# Patient Record
Sex: MALE | Race: WHITE | NOT HISPANIC OR LATINO | Employment: OTHER | URBAN - METROPOLITAN AREA
[De-identification: names, ages, dates, MRNs, and addresses within clinical notes are randomized per-mention and may not be internally consistent; named-entity substitution may affect disease eponyms.]

---

## 2018-05-11 ENCOUNTER — APPOINTMENT (OUTPATIENT)
Dept: CT IMAGING | Facility: CLINIC | Age: 63
End: 2018-05-11
Attending: FAMILY MEDICINE
Payer: COMMERCIAL

## 2018-05-11 ENCOUNTER — HOSPITAL ENCOUNTER (EMERGENCY)
Facility: CLINIC | Age: 63
Discharge: HOME | End: 2018-05-11
Attending: FAMILY MEDICINE | Admitting: FAMILY MEDICINE
Payer: COMMERCIAL

## 2018-05-11 VITALS
OXYGEN SATURATION: 95 % | RESPIRATION RATE: 18 BRPM | HEART RATE: 80 BPM | DIASTOLIC BLOOD PRESSURE: 74 MMHG | TEMPERATURE: 98.2 F | BODY MASS INDEX: 34.54 KG/M2 | HEIGHT: 72 IN | WEIGHT: 255 LBS | SYSTOLIC BLOOD PRESSURE: 134 MMHG

## 2018-05-11 DIAGNOSIS — N20.1 URETERAL STONE: ICD-10-CM

## 2018-05-11 LAB
ALBUMIN SERPL-MCNC: 4.1 G/DL (ref 3.4–5)
ALP SERPL-CCNC: 69 U/L (ref 40–150)
ALT SERPL W P-5'-P-CCNC: 63 U/L (ref 0–70)
ANION GAP SERPL CALCULATED.3IONS-SCNC: 7 MMOL/L (ref 3–14)
AST SERPL W P-5'-P-CCNC: 28 U/L (ref 0–45)
BASOPHILS # BLD AUTO: 0 10E9/L (ref 0–0.2)
BASOPHILS NFR BLD AUTO: 0.3 %
BILIRUB SERPL-MCNC: 0.3 MG/DL (ref 0.2–1.3)
BUN SERPL-MCNC: 22 MG/DL (ref 7–30)
CALCIUM SERPL-MCNC: 9.2 MG/DL (ref 8.5–10.1)
CHLORIDE SERPL-SCNC: 105 MMOL/L (ref 94–109)
CO2 SERPL-SCNC: 27 MMOL/L (ref 20–32)
CREAT SERPL-MCNC: 1.13 MG/DL (ref 0.66–1.25)
DIFFERENTIAL METHOD BLD: ABNORMAL
EOSINOPHIL # BLD AUTO: 0.1 10E9/L (ref 0–0.7)
EOSINOPHIL NFR BLD AUTO: 1.1 %
ERYTHROCYTE [DISTWIDTH] IN BLOOD BY AUTOMATED COUNT: 13.1 % (ref 10–15)
GFR SERPL CREATININE-BSD FRML MDRD: 65 ML/MIN/1.7M2
GLUCOSE SERPL-MCNC: 141 MG/DL (ref 70–99)
HCT VFR BLD AUTO: 42.7 % (ref 40–53)
HGB BLD-MCNC: 15 G/DL (ref 13.3–17.7)
IMM GRANULOCYTES # BLD: 0 10E9/L (ref 0–0.4)
IMM GRANULOCYTES NFR BLD: 0.3 %
LYMPHOCYTES # BLD AUTO: 2 10E9/L (ref 0.8–5.3)
LYMPHOCYTES NFR BLD AUTO: 18 %
MCH RBC QN AUTO: 31.2 PG (ref 26.5–33)
MCHC RBC AUTO-ENTMCNC: 35.1 G/DL (ref 31.5–36.5)
MCV RBC AUTO: 89 FL (ref 78–100)
MONOCYTES # BLD AUTO: 1.4 10E9/L (ref 0–1.3)
MONOCYTES NFR BLD AUTO: 12.9 %
NEUTROPHILS # BLD AUTO: 7.4 10E9/L (ref 1.6–8.3)
NEUTROPHILS NFR BLD AUTO: 67.4 %
PLATELET # BLD AUTO: 317 10E9/L (ref 150–450)
POTASSIUM SERPL-SCNC: 4 MMOL/L (ref 3.4–5.3)
PROT SERPL-MCNC: 7.5 G/DL (ref 6.8–8.8)
RBC # BLD AUTO: 4.81 10E12/L (ref 4.4–5.9)
SODIUM SERPL-SCNC: 139 MMOL/L (ref 133–144)
WBC # BLD AUTO: 10.9 10E9/L (ref 4–11)

## 2018-05-11 PROCEDURE — 25000128 H RX IP 250 OP 636: Performed by: FAMILY MEDICINE

## 2018-05-11 PROCEDURE — 96375 TX/PRO/DX INJ NEW DRUG ADDON: CPT | Performed by: FAMILY MEDICINE

## 2018-05-11 PROCEDURE — 96374 THER/PROPH/DIAG INJ IV PUSH: CPT | Performed by: FAMILY MEDICINE

## 2018-05-11 PROCEDURE — 80053 COMPREHEN METABOLIC PANEL: CPT | Performed by: FAMILY MEDICINE

## 2018-05-11 PROCEDURE — 74176 CT ABD & PELVIS W/O CONTRAST: CPT

## 2018-05-11 PROCEDURE — 96376 TX/PRO/DX INJ SAME DRUG ADON: CPT | Performed by: FAMILY MEDICINE

## 2018-05-11 PROCEDURE — 99284 EMERGENCY DEPT VISIT MOD MDM: CPT | Mod: Z6 | Performed by: FAMILY MEDICINE

## 2018-05-11 PROCEDURE — 85025 COMPLETE CBC W/AUTO DIFF WBC: CPT | Performed by: FAMILY MEDICINE

## 2018-05-11 PROCEDURE — 25000132 ZZH RX MED GY IP 250 OP 250 PS 637: Performed by: FAMILY MEDICINE

## 2018-05-11 PROCEDURE — 96361 HYDRATE IV INFUSION ADD-ON: CPT | Performed by: FAMILY MEDICINE

## 2018-05-11 PROCEDURE — 99285 EMERGENCY DEPT VISIT HI MDM: CPT | Mod: 25 | Performed by: FAMILY MEDICINE

## 2018-05-11 RX ORDER — TAMSULOSIN HYDROCHLORIDE 0.4 MG/1
0.4 CAPSULE ORAL DAILY
Qty: 10 CAPSULE | Refills: 0 | Status: SHIPPED | OUTPATIENT
Start: 2018-05-11 | End: 2020-10-12

## 2018-05-11 RX ORDER — HYDROMORPHONE HYDROCHLORIDE 2 MG/1
2-4 TABLET ORAL EVERY 6 HOURS PRN
Qty: 12 TABLET | Refills: 0 | Status: SHIPPED | OUTPATIENT
Start: 2018-05-11 | End: 2018-05-11

## 2018-05-11 RX ORDER — ONDANSETRON 4 MG/1
4-8 TABLET, ORALLY DISINTEGRATING ORAL EVERY 8 HOURS PRN
Qty: 2 TABLET | Refills: 0 | Status: SHIPPED | OUTPATIENT
Start: 2018-05-11 | End: 2018-05-11

## 2018-05-11 RX ORDER — HYDROMORPHONE HYDROCHLORIDE 1 MG/ML
0.5 INJECTION, SOLUTION INTRAMUSCULAR; INTRAVENOUS; SUBCUTANEOUS
Status: DISCONTINUED | OUTPATIENT
Start: 2018-05-11 | End: 2018-05-11 | Stop reason: HOSPADM

## 2018-05-11 RX ORDER — HYDROMORPHONE HYDROCHLORIDE 2 MG/1
2-4 TABLET ORAL EVERY 6 HOURS PRN
Qty: 12 TABLET | Refills: 0 | Status: SHIPPED | OUTPATIENT
Start: 2018-05-11 | End: 2020-10-12

## 2018-05-11 RX ORDER — ONDANSETRON 2 MG/ML
4 INJECTION INTRAMUSCULAR; INTRAVENOUS ONCE
Status: DISCONTINUED | OUTPATIENT
Start: 2018-05-11 | End: 2018-05-11 | Stop reason: HOSPADM

## 2018-05-11 RX ORDER — ONDANSETRON 2 MG/ML
4 INJECTION INTRAMUSCULAR; INTRAVENOUS
Status: DISCONTINUED | OUTPATIENT
Start: 2018-05-11 | End: 2018-05-11 | Stop reason: HOSPADM

## 2018-05-11 RX ORDER — TAMSULOSIN HYDROCHLORIDE 0.4 MG/1
0.4 CAPSULE ORAL ONCE
Status: COMPLETED | OUTPATIENT
Start: 2018-05-11 | End: 2018-05-11

## 2018-05-11 RX ORDER — ONDANSETRON 4 MG/1
4-8 TABLET, ORALLY DISINTEGRATING ORAL EVERY 8 HOURS PRN
Qty: 12 TABLET | Refills: 0 | Status: SHIPPED | OUTPATIENT
Start: 2018-05-11 | End: 2018-05-11

## 2018-05-11 RX ORDER — TAMSULOSIN HYDROCHLORIDE 0.4 MG/1
0.4 CAPSULE ORAL DAILY
Qty: 10 CAPSULE | Refills: 0 | Status: SHIPPED | OUTPATIENT
Start: 2018-05-11 | End: 2018-05-11

## 2018-05-11 RX ORDER — ONDANSETRON 4 MG/1
4 TABLET, ORALLY DISINTEGRATING ORAL EVERY 8 HOURS PRN
Status: DISCONTINUED | OUTPATIENT
Start: 2018-05-11 | End: 2018-05-11 | Stop reason: HOSPADM

## 2018-05-11 RX ORDER — HYDROMORPHONE HYDROCHLORIDE 2 MG/1
2-4 TABLET ORAL EVERY 6 HOURS PRN
Qty: 4 TABLET | Refills: 0 | Status: SHIPPED | OUTPATIENT
Start: 2018-05-11 | End: 2018-05-11

## 2018-05-11 RX ORDER — ONDANSETRON 4 MG/1
4-8 TABLET, ORALLY DISINTEGRATING ORAL EVERY 8 HOURS PRN
Qty: 12 TABLET | Refills: 0 | Status: SHIPPED | OUTPATIENT
Start: 2018-05-11 | End: 2020-10-12

## 2018-05-11 RX ORDER — HYDROMORPHONE HYDROCHLORIDE 2 MG/1
2-4 TABLET ORAL EVERY 6 HOURS PRN
Qty: 4 TABLET | Refills: 0 | Status: SHIPPED | OUTPATIENT
Start: 2018-05-11 | End: 2020-10-12

## 2018-05-11 RX ORDER — ONDANSETRON 4 MG/1
4-8 TABLET, ORALLY DISINTEGRATING ORAL EVERY 8 HOURS PRN
Qty: 2 TABLET | Refills: 0 | Status: SHIPPED | OUTPATIENT
Start: 2018-05-11 | End: 2020-10-12

## 2018-05-11 RX ORDER — HYDROMORPHONE HYDROCHLORIDE 2 MG/1
2-4 TABLET ORAL EVERY 6 HOURS PRN
Status: DISCONTINUED | OUTPATIENT
Start: 2018-05-11 | End: 2018-05-11 | Stop reason: HOSPADM

## 2018-05-11 RX ORDER — KETOROLAC TROMETHAMINE 30 MG/ML
30 INJECTION, SOLUTION INTRAMUSCULAR; INTRAVENOUS ONCE
Status: COMPLETED | OUTPATIENT
Start: 2018-05-11 | End: 2018-05-11

## 2018-05-11 RX ADMIN — TAMSULOSIN HYDROCHLORIDE 0.4 MG: 0.4 CAPSULE ORAL at 03:16

## 2018-05-11 RX ADMIN — ONDANSETRON 4 MG: 2 INJECTION INTRAMUSCULAR; INTRAVENOUS at 01:34

## 2018-05-11 RX ADMIN — HYDROMORPHONE HYDROCHLORIDE 0.5 MG: 1 INJECTION, SOLUTION INTRAMUSCULAR; INTRAVENOUS; SUBCUTANEOUS at 03:13

## 2018-05-11 RX ADMIN — SODIUM CHLORIDE 1000 ML: 9 INJECTION, SOLUTION INTRAVENOUS at 01:34

## 2018-05-11 RX ADMIN — HYDROMORPHONE HYDROCHLORIDE 0.5 MG: 1 INJECTION, SOLUTION INTRAMUSCULAR; INTRAVENOUS; SUBCUTANEOUS at 01:37

## 2018-05-11 RX ADMIN — KETOROLAC TROMETHAMINE 30 MG: 30 INJECTION, SOLUTION INTRAMUSCULAR at 01:34

## 2018-05-11 NOTE — ED AVS SNAPSHOT
[facility] Emergency Department    [address] [address]    Phone:  [phone]    Fax:  [phone]                                       Igor Norris   MRN: [id number]    Department:  [facility] Emergency Department   Date of Visit:  5/11/2018           After Visit Summary Signature Page     I have received my discharge instructions, and my questions have been answered. I have discussed any challenges I see with this plan with the nurse or doctor.    ..........................................................................................................................................  Patient/Patient Representative Signature      ..........................................................................................................................................  Patient Representative Print Name and Relationship to Patient    ..................................................               ................................................  Date                                            Time    ..........................................................................................................................................  Reviewed by Signature/Title    ...................................................              ..............................................  Date                                                            Time

## 2018-05-11 NOTE — ED NOTES
Presents with sudden onset RLQ pain, started approx 4 hours ago. Pt took advil PM 1 tab to try to sleep unable. No n/v, no fevers. Tried to make self vomit to feel better, unable, normal BM tonight. Pt ate at 1800.

## 2018-05-11 NOTE — ED AVS SNAPSHOT
[facility] Department    [address] [address]    Phone:  [phone]    Fax:  [phone]                                       Igor Norris   MRN: [id number]    Department:  [facility] Department   Date of Visit:  5/11/2018           Patient Information     Date Of Birth          1955        Your diagnoses for this visit were:     Ureteral stone        You were seen by Marcus Rojas MD.        Discharge Instructions       Return to the Emergency Room if the following occurs:     Worsened pain, fever, nausea with vomiting and dehydration, or for any concern at anytime.    Or, follow-up with the following provider as we discussed:     Return to urology if not improving over the next 5-7 days.  Call [phone] to schedule.    Medications discussed:    Ibuprofen 600mg every 6 hours for pain (7 days duration).  Tylenol 1000mg every 6 hours for pain (7 days duration).  Dilaudid 1-2 tabs every 6 hours, for pain not relieved by the above.  Zofran for nausea, as needed.  Flomax.    If you received pain-relieving or sedating medication during your time in the ER, avoid alcohol, driving automobiles, or working with machinery.  Also, a responsible adult must stay with you.      If you had X-rays or labs done we will attempt to contact you if there is a change needed in your care.      Call the Nurse Advice Line at [phone] or [phone] for any concern at anytime.        24 Hour Appointment Hotline       To make an appointment at any [facility] clinic, call [phone] ([phone]). If you don't have a family doctor or clinic, we will help you find one. [facility] clinics are conveniently located to serve the needs of you and your family.             Review of your medicines      START taking        Dose / Directions Last dose taken    * HYDROmorphone 2 MG tablet   Commonly known as:  DILAUDID   Dose:  2-4 mg   Quantity:  4 tablet        Take 1-2  tablets (2-4 mg) by mouth every 6 hours as needed for pain   Refills:  0        * HYDROmorphone 2 MG tablet   Commonly known as:  DILAUDID   Dose:  2-4 mg   Quantity:  12 tablet        Take 1-2 tablets (2-4 mg) by mouth every 6 hours as needed for pain   Refills:  0        * ondansetron 4 MG ODT tab   Commonly known as:  ZOFRAN ODT   Dose:  4-8 mg   Quantity:  2 tablet        Take 1-2 tablets (4-8 mg) by mouth every 8 hours as needed for nausea   Refills:  0        * ondansetron 4 MG ODT tab   Commonly known as:  ZOFRAN ODT   Dose:  4-8 mg   Quantity:  12 tablet        Take 1-2 tablets (4-8 mg) by mouth every 8 hours as needed for nausea   Refills:  0        tamsulosin 0.4 MG capsule   Commonly known as:  FLOMAX   Dose:  0.4 mg   Quantity:  10 capsule        Take 1 capsule (0.4 mg) by mouth daily for 10 days   Refills:  0        * Notice:  This list has 4 medication(s) that are the same as other medications prescribed for you. Read the directions carefully, and ask your doctor or other care provider to review them with you.      Our records show that you are taking the medicines listed below. If these are incorrect, please call your family doctor or clinic.        Dose / Directions Last dose taken    aspirin 81 MG EC tablet        1 TABLET DAILY   Refills:  0        buPROPion 150 MG 12 hr tablet   Commonly known as:  ZYBAN   Dose:  150 mg        Take 150 mg by mouth 2 times daily   Refills:  0        lisinopril-hydrochlorothiazide 20-25 MG per tablet   Commonly known as:  PRINZIDE/ZESTORETIC        1 TABLET DAILY   Refills:  0        meclizine 12.5 MG tablet   Commonly known as:  ANTIVERT   Dose:  12.5 mg   Quantity:  30 tablet        Take 1 tablet (12.5 mg) by mouth 4 times daily as needed for dizziness   Refills:  0        PRAVASTATIN SODIUM PO   Dose:  80 mg        Take 80 mg by mouth   Refills:  0                Information about OPIOIDS     PRESCRIPTION OPIOIDS: WHAT YOU NEED TO KNOW   You have a prescription  for an opioid (narcotic) pain medicine. Opioids can cause addiction. If you have a history of chemical dependency of any type, you are at a higher risk of becoming addicted to opioids. Only take this medicine after all other options have been tried. Take it for as short a time and as few doses as possible.     Do not:    Drive. If you drive while taking these medicines, you could be arrested for driving under the influence (DUI).    Operate heavy machinery    Do any other dangerous activities while taking these medicines.     Drink any alcohol while taking these medicines.      Take with any other medicines that contain acetaminophen. Read all labels carefully. Look for the word  acetaminophen  or  Tylenol.  Ask your pharmacist if you have questions or are unsure.    Store your pills in a secure place, locked if possible. We will not replace any lost or stolen medicine. If you don t finish your medicine, please throw away (dispose) as directed by your pharmacist. The [place] [employer] has more information about safe disposal: https://www.pca.[facility].[place].us/living-green/managing-unwanted-medications    All opioids tend to cause constipation. Drink plenty of water and eat foods that have a lot of fiber, such as fruits, vegetables, prune juice, apple juice and high-fiber cereal. Take a laxative (Miralax, milk of magnesia, Colace, Senna) if you don t move your bowels at least every other day.         Prescriptions were sent or printed at these locations (5 Prescriptions)                   Other Prescriptions                Printed at Department/Unit printer (5 of 5)         HYDROmorphone (DILAUDID) 2 MG tablet               HYDROmorphone (DILAUDID) 2 MG tablet               ondansetron (ZOFRAN ODT) 4 MG ODT tab               ondansetron (ZOFRAN ODT) 4 MG ODT tab               tamsulosin (FLOMAX) 0.4 MG capsule                Procedures and tests performed during your visit     Abd/pelvis CT - no contrast -  Stone Protocol    CBC with platelets, differential    Comprehensive metabolic panel      Orders Needing Specimen Collection     Ordered          05/11/18 0130  UA with Microscopic reflex to Culture - STAT, [facility]: STAT, Needs to be Collected     Scheduled Task Status   05/11/18 0131 Collect UA with Microscopic reflex to Culture Open   Order Class:  PCU Collect                  Pending Results     Date and Time Order Name Status Description    5/11/2018 0131 Abd/pelvis CT - no contrast - Stone Protocol Preliminary             Pending Culture Results     No orders found from 5/9/2018 to 5/12/2018.            Pending Results Instructions     If you had any lab results that were not finalized at the time of your Discharge, you can call the ED Lab Result RN at [phone]. You will be contacted by this team for any positive Lab results or changes in treatment. The nurses are available 7 days a week from 10A to 6:30P.  You can leave a message 24 hours per day and they will return your call.        Test Results From Your Hospital Stay        5/11/2018  2:04 AM      Component Results     Component Value Ref Range & Units Status    WBC 10.9 4.0 - 11.0 10e9/L Final    RBC Count 4.81 4.4 - 5.9 10e12/L Final    Hemoglobin 15.0 13.3 - 17.7 g/dL Final    Hematocrit 42.7 40.0 - 53.0 % Final    MCV 89 78 - 100 fl Final    MCH 31.2 26.5 - 33.0 pg Final    MCHC 35.1 31.5 - 36.5 g/dL Final    RDW 13.1 10.0 - 15.0 % Final    Platelet Count 317 150 - 450 10e9/L Final    Diff Method Automated Method  Final    % Neutrophils 67.4 % Final    % Lymphocytes 18.0 % Final    % Monocytes 12.9 % Final    % Eosinophils 1.1 % Final    % Basophils 0.3 % Final    % Immature Granulocytes 0.3 % Final    Absolute Neutrophil 7.4 1.6 - 8.3 10e9/L Final    Absolute Lymphocytes 2.0 0.8 - 5.3 10e9/L Final    Absolute Monocytes 1.4 (H) 0.0 - 1.3 10e9/L Final    Absolute Eosinophils 0.1 0.0 - 0.7 10e9/L Final    Absolute Basophils 0.0 0.0 - 0.2 10e9/L Final     Abs Immature Granulocytes 0.0 0 - 0.4 10e9/L Final         5/11/2018  2:08 AM      Component Results     Component Value Ref Range & Units Status    Sodium 139 133 - 144 mmol/L Final    Potassium 4.0 3.4 - 5.3 mmol/L Final    Chloride 105 94 - 109 mmol/L Final    Carbon Dioxide 27 20 - 32 mmol/L Final    Anion Gap 7 3 - 14 mmol/L Final    Glucose 141 (H) 70 - 99 mg/dL Final    Urea Nitrogen 22 7 - 30 mg/dL Final    Creatinine 1.13 0.66 - 1.25 mg/dL Final    GFR Estimate 65 >60 mL/min/1.7m2 Final    Non  GFR Calc    GFR Estimate If Black 79 >60 mL/min/1.7m2 Final    African American GFR Calc    Calcium 9.2 8.5 - 10.1 mg/dL Final    Bilirubin Total 0.3 0.2 - 1.3 mg/dL Final    Albumin 4.1 3.4 - 5.0 g/dL Final    Protein Total 7.5 6.8 - 8.8 g/dL Final    Alkaline Phosphatase 69 40 - 150 U/L Final    ALT 63 0 - 70 U/L Final    AST 28 0 - 45 U/L Final         5/11/2018  2:15 AM      Narrative     CT ABDOMEN PELVIS W/O CONTRAST  5/11/2018 1:58 AM     HISTORY: Flank pain, sudden onset. Family history of stone.     TECHNIQUE: Noncontrast CT abdomen and pelvis was performed. Radiation  dose for this scan was reduced using automated exposure control,  adjustment of the mA and/or kV according to patient size, or iterative  reconstruction technique.    COMPARISON: None.    FINDINGS:  Abdomen: There is mild dilatation of the right renal collecting system  and ureter into the pelvis where there is a 0.5 cm distal ureteral  stone approximately 2 cm above the ureterovesical junction. There is a  1.1 cm nonobstructing stone in the right renal pelvis. Single 0.7 cm  stone in the left kidney. Two left renal cysts.    The lung bases are unremarkable. The heart size is normal. Evaluation  of the solid abdominal organs is limited by the lack of intravenous  contrast. The liver, spleen, gallbladder, pancreas and adrenal glands  are normal in appearance. There is no abdominal or pelvic lymph node  enlargement.    Pelvis:  "There is no bowel obstruction or inflammation. The appendix is  normal. No free intraperitoneal gas or fluid. Multiple pelvic  phleboliths.        Impression     IMPRESSION:  1. There is a 0.5 cm distal right ureteral stone causing mild  hydronephrosis.  2. Bilateral intrarenal stones.                Thank you for choosing [facility]       Thank you for choosing [facility] for your care. Our goal is always to provide you with excellent care. Hearing back from our patients is one way we can continue to improve our services. Please take a few minutes to complete the written survey that you may receive in the mail after you visit with us. Thank you!        [employer] Information     [employer] lets you send messages to your doctor, view your test results, renew your prescriptions, schedule appointments and more. To sign up, go to [email or web address] . Click on \"Log in\" on the left side of the screen, which will take you to the Welcome page. Then click on \"Sign up Now\" on the right side of the page.     You will be asked to enter the access code listed below, as well as some personal information. Please follow the directions to create your username and password.     Your access code is: [email or web address]  Expires: 2018  2:53 AM     Your access code will  in 90 days. If you need help or a new code, please call your [facility] clinic or [phone].        Care EveryWhere ID     This is your Care EveryWhere ID. This could be used by other organizations to access your [facility] medical records  [id number]        Equal Access to Services     MAHESH BAY : [place] dunno Sobib, waaxda luqadaha, qaybta kaalmada norberto, lenin marcelino . So [facility] [phone].    ATENCIÓN: Si habla español, tiene a muller disposición servicios gratuitos de asistencia lingüística. Llame al [phone].    We comply with applicable federal civil rights laws and [place] laws. We do not discriminate on the basis " of race, color, national origin, age, disability, sex, sexual orientation, or gender identity.            After Visit Summary       This is your record. Keep this with you and show to your community pharmacist(s) and doctor(s) at your next visit.

## 2018-05-11 NOTE — ED NOTES
Rounds completed. Pain decreased and now tolerable. Pt revitaled and on sat monitor. Pt reports he is unable to provide UA at this time--IV fluids are infusing to promote hydration. Pt will call when able.

## 2018-05-11 NOTE — ED PROVIDER NOTES
HPI  Vision is a 62-year-old male presenting with right flank pain.  Past medical history is reviewed.  Medications reviewed.  Not a smoker.  No drugs.  No alcohol.  Family history of ureteral stone with his dad.    Patient had sudden onset of pain felt in the right lower abdomen.  He has been experiencing some low back pain as well.  He feels grown pain and testicular pain on the right side.  The pain has worsened with time.  He has had increased urinary frequency tonight.  He has been nauseous but no vomiting reported.  No diarrhea.  No constipation.  No trauma or injury.  No chest pain.  No shortness of breath.  No skin rash.  No discharge from the penis.  He has not had similar symptoms previously.    ROS: All other review of systems are negative other than that noted above.     Past Medical History:   Diagnosis Date     Depression      Hyperlipidemia      Hypertension      Past Surgical History:   Procedure Laterality Date     ARTHROSCOPY KNEE  10/29/2010    ARTHROSCOPY KNEE performed by SUSIE THOMAS at [facility]     BREAST SURGERY      reduction     DISSECT LYMPH NODE INGUINAL      left     HERNIA REPAIR      umbilical     VASECTOMY       VASECTOMY      & reversal     wisdom teeth       No family history on file.  Social History   Substance Use Topics     Smoking status: Never Smoker     Smokeless tobacco: Not on file     Alcohol use No         PHYSICAL  /81  Pulse 80  Temp 98.2  F (36.8  C) (Oral)  Resp 18  Ht 1.829 m (6')  Wt 115.7 kg (255 lb)  SpO2 96%  BMI 34.58 kg/m2  General: Patient is alert and in severe distress.  Patient is having a hard time getting comfortable in the bed.  Neurological: Alert.  Moving upper and lower extremities equally, bilaterally.  Head / Neck: Atraumatic.  Ears: Not done.  Eyes: Pupils are equal, round, and reactive.  Normal conjunctiva.  Nose: Midline.  No epistaxis.  Mouth / Throat: No ulcerations or lesions.  Upper pharynx is not erythematous.  Moist.   Respiratory: No respiratory distress. CTA B.  Cardiovascular: Regular rhythm.  Peripheral extremities are warm.  No edema.  No calf tenderness.  Abdomen / Pelvis: Mild tenderness in the right lower quadrant and lower pelvis.  No distention.  Soft throughout.  Genitalia: Not done.  Musculoskeletal: No tenderness over major muscles and joints.  Skin: No evidence of rash or trauma.        ED COURSE  0136.  Patient has new sudden onset right flank pain associated with nausea and increased urinary frequency.  Ureteral stone is of high concern.  CT imaging pending.  He is unable to provide a urine at this time.  Blood work pending.  Toradol, Dilaudid, Zofran, fluid bolus ordered.    Labs Ordered and Resulted from Time of ED Arrival Up to the Time of Departure from the ED   CBC WITH PLATELETS DIFFERENTIAL - Abnormal; Notable for the following:        Result Value    Absolute Monocytes 1.4 (*)     All other components within normal limits   COMPREHENSIVE METABOLIC PANEL - Abnormal; Notable for the following:     Glucose 141 (*)     All other components within normal limits   ROUTINE UA WITH MICROSCOPIC REFLEX TO CULTURE       IMAGING  Images reviewed by me.  Radiology report also reviewed.  Abd/pelvis CT - no contrast - Stone Protocol   Preliminary Result   IMPRESSION:   1. There is a 0.5 cm distal right ureteral stone causing mild   hydronephrosis.   2. Bilateral intrarenal stones.        0250.  CT scan shows evidence of a ureteral stone on the right side.  This is 0.5 cm in diameter.  No other acute findings on the CT scan.  The patient is comfortable currently.  I will provide the patient Dilaudid #4 tablets for home.  I will provide the patient Zofran #2 tablets for home.  I will give him Flomax before he goes.  I will provide him with a Dilaudid prescription for #12 tablets at home.  I will provide him with a Zofran prescription for #12 tablets at home.  I will provide him with a Flomax prescription for home.  Follow  up information provided.  Return here for worsening as discussed.    Medications   HYDROmorphone (PF) (DILAUDID) injection 0.5 mg (0.5 mg Intravenous Given 5/11/18 0137)   ondansetron (ZOFRAN) injection 4 mg (4 mg Intravenous Given 5/11/18 0134)   tamsulosin (FLOMAX) capsule 0.4 mg (not administered)   ondansetron (ZOFRAN) injection 4 mg (not administered)   ketorolac (TORADOL) injection 30 mg (30 mg Intravenous Given 5/11/18 0134)   0.9% sodium chloride BOLUS (1,000 mLs Intravenous New Bag 5/11/18 0134)       IMPRESSION    ICD-10-CM    1. Ureteral stone N20.1            Critical Care time:  none                    Marcus Rojas MD  05/11/18 8080

## 2018-05-11 NOTE — DISCHARGE INSTRUCTIONS
Return to the Emergency Room if the following occurs:     Worsened pain, fever, nausea with vomiting and dehydration, or for any concern at anytime.    Or, follow-up with the following provider as we discussed:     Return to urology if not improving over the next 5-7 days.  Call [phone] to schedule.    Medications discussed:    Ibuprofen 600mg every 6 hours for pain (7 days duration).  Tylenol 1000mg every 6 hours for pain (7 days duration).  Dilaudid 1-2 tabs every 6 hours, for pain not relieved by the above.  Zofran for nausea, as needed.  Flomax.    If you received pain-relieving or sedating medication during your time in the ER, avoid alcohol, driving automobiles, or working with machinery.  Also, a responsible adult must stay with you.      If you had X-rays or labs done we will attempt to contact you if there is a change needed in your care.      Call the Nurse Advice Line at [phone] or [phone] for any concern at anytime.

## 2018-05-14 ENCOUNTER — HOSPITAL ENCOUNTER (EMERGENCY)
Facility: CLINIC | Age: 63
Discharge: HOME | End: 2018-05-14
Attending: NURSE PRACTITIONER | Admitting: NURSE PRACTITIONER
Payer: COMMERCIAL

## 2018-05-14 VITALS
DIASTOLIC BLOOD PRESSURE: 90 MMHG | OXYGEN SATURATION: 98 % | HEIGHT: 72 IN | TEMPERATURE: 97.8 F | SYSTOLIC BLOOD PRESSURE: 148 MMHG | BODY MASS INDEX: 33.86 KG/M2 | WEIGHT: 250 LBS | RESPIRATION RATE: 18 BRPM

## 2018-05-14 DIAGNOSIS — N20.0 KIDNEY STONE: ICD-10-CM

## 2018-05-14 LAB
ALBUMIN UR-MCNC: NEGATIVE MG/DL
APPEARANCE UR: CLEAR
BILIRUB UR QL STRIP: NEGATIVE
COLOR UR AUTO: YELLOW
GLUCOSE UR STRIP-MCNC: NEGATIVE MG/DL
HGB UR QL STRIP: ABNORMAL
KETONES UR STRIP-MCNC: NEGATIVE MG/DL
LEUKOCYTE ESTERASE UR QL STRIP: NEGATIVE
MUCOUS THREADS #/AREA URNS LPF: PRESENT /LPF
NITRATE UR QL: NEGATIVE
PH UR STRIP: 5 PH (ref 5–7)
RBC #/AREA URNS AUTO: >182 /HPF (ref 0–2)
SOURCE: ABNORMAL
SP GR UR STRIP: 1.02 (ref 1–1.03)
SQUAMOUS #/AREA URNS AUTO: <1 /HPF (ref 0–1)
UROBILINOGEN UR STRIP-MCNC: 0 MG/DL (ref 0–2)
WBC #/AREA URNS AUTO: 4 /HPF (ref 0–5)

## 2018-05-14 PROCEDURE — 81001 URINALYSIS AUTO W/SCOPE: CPT | Performed by: PHYSICIAN ASSISTANT

## 2018-05-14 PROCEDURE — G0463 HOSPITAL OUTPT CLINIC VISIT: HCPCS | Performed by: NURSE PRACTITIONER

## 2018-05-14 PROCEDURE — 87086 URINE CULTURE/COLONY COUNT: CPT | Performed by: PHYSICIAN ASSISTANT

## 2018-05-14 PROCEDURE — 99214 OFFICE O/P EST MOD 30 MIN: CPT | Mod: Z6 | Performed by: NURSE PRACTITIONER

## 2018-05-14 ASSESSMENT — ENCOUNTER SYMPTOMS
FLANK PAIN: 0
FEVER: 0
CHILLS: 0
NAUSEA: 0
COUGH: 0
FREQUENCY: 1
ABDOMINAL PAIN: 0
VOMITING: 0
BACK PAIN: 0

## 2018-05-14 NOTE — ED AVS SNAPSHOT
[facility] Emergency Department    [address] [address]    Phone:  [phone]    Fax:  [phone]                                       Igor Norris   MRN: [id number]    Department:  [facility] Emergency Department   Date of Visit:  5/14/2018           Patient Information     Date Of Birth          1955        Your diagnoses for this visit were:     Kidney stone        You were seen by Christy Monroe APRN CNP.        Discharge Instructions       No evidence for urinary infection.  Continue to take flomax.  Follow-up with urology if pain persists >1 week.    24 Hour Appointment Hotline       To make an appointment at any [facility] clinic, call [phone] ([phone]). If you don't have a family doctor or clinic, we will help you find one. [facility] clinics are conveniently located to serve the needs of you and your family.             Review of your medicines      Our records show that you are taking the medicines listed below. If these are incorrect, please call your family doctor or clinic.        Dose / Directions Last dose taken    aspirin 81 MG EC tablet        1 TABLET DAILY   Refills:  0        buPROPion 150 MG 12 hr tablet   Commonly known as:  ZYBAN   Dose:  150 mg        Take 150 mg by mouth 2 times daily   Refills:  0        * HYDROmorphone 2 MG tablet   Commonly known as:  DILAUDID   Dose:  2-4 mg   Quantity:  4 tablet        Take 1-2 tablets (2-4 mg) by mouth every 6 hours as needed for pain   Refills:  0        * HYDROmorphone 2 MG tablet   Commonly known as:  DILAUDID   Dose:  2-4 mg   Quantity:  12 tablet        Take 1-2 tablets (2-4 mg) by mouth every 6 hours as needed for pain   Refills:  0        lisinopril-hydrochlorothiazide 20-25 MG per tablet   Commonly known as:  PRINZIDE/ZESTORETIC        1 TABLET DAILY   Refills:  0        meclizine 12.5 MG tablet   Commonly known as:  ANTIVERT   Dose:  12.5 mg   Quantity:  30 tablet        Take 1  tablet (12.5 mg) by mouth 4 times daily as needed for dizziness   Refills:  0        * ondansetron 4 MG ODT tab   Commonly known as:  ZOFRAN ODT   Dose:  4-8 mg   Quantity:  2 tablet        Take 1-2 tablets (4-8 mg) by mouth every 8 hours as needed for nausea   Refills:  0        * ondansetron 4 MG ODT tab   Commonly known as:  ZOFRAN ODT   Dose:  4-8 mg   Quantity:  12 tablet        Take 1-2 tablets (4-8 mg) by mouth every 8 hours as needed for nausea   Refills:  0        PRAVASTATIN SODIUM PO   Dose:  80 mg        Take 80 mg by mouth   Refills:  0        tamsulosin 0.4 MG capsule   Commonly known as:  FLOMAX   Dose:  0.4 mg   Quantity:  10 capsule        Take 1 capsule (0.4 mg) by mouth daily   Refills:  0        * Notice:  This list has 4 medication(s) that are the same as other medications prescribed for you. Read the directions carefully, and ask your doctor or other care provider to review them with you.            Procedures and tests performed during your visit     UA with Microscopic    Urine Culture      Orders Needing Specimen Collection     None      Pending Results     Date and Time Order Name Status Description    5/14/2018 1554 Urine Culture In process             Pending Culture Results     Date and Time Order Name Status Description    5/14/2018 1554 Urine Culture In process             Pending Results Instructions     If you had any lab results that were not finalized at the time of your Discharge, you can call the ED Lab Result RN at [phone]. You will be contacted by this team for any positive Lab results or changes in treatment. The nurses are available 7 days a week from 10A to 6:30P.  You can leave a message 24 hours per day and they will return your call.        Test Results From Your Hospital Stay        5/14/2018  4:27 PM      Component Results     Component Value Ref Range & Units Status    Color Urine Yellow  Final    Appearance Urine Clear  Final    Glucose Urine Negative  "NEG^Negative mg/dL Final    Bilirubin Urine Negative NEG^Negative Final    Ketones Urine Negative NEG^Negative mg/dL Final    Specific Gravity Urine 1.020 1.003 - 1.035 Final    Blood Urine Large (A) NEG^Negative Final    pH Urine 5.0 5.0 - 7.0 pH Final    Protein Albumin Urine Negative NEG^Negative mg/dL Final    Urobilinogen mg/dL 0.0 0.0 - 2.0 mg/dL Final    Nitrite Urine Negative NEG^Negative Final    Leukocyte Esterase Urine Negative NEG^Negative Final    Source Midstream Urine  Final    WBC Urine 4 0 - 5 /HPF Final    RBC Urine >182 (H) 0 - 2 /HPF Final    Squamous Epithelial /HPF Urine <1 0 - 1 /HPF Final    Mucous Urine Present (A) NEG^Negative /LPF Final         2018  4:04 PM                Thank you for choosing [facility]       Thank you for choosing [facility] for your care. Our goal is always to provide you with excellent care. Hearing back from our patients is one way we can continue to improve our services. Please take a few minutes to complete the written survey that you may receive in the mail after you visit with us. Thank you!        [employer] Information     [employer] lets you send messages to your doctor, view your test results, renew your prescriptions, schedule appointments and more. To sign up, go to [email or web address] . Click on \"Log in\" on the left side of the screen, which will take you to the Welcome page. Then click on \"Sign up Now\" on the right side of the page.     You will be asked to enter the access code listed below, as well as some personal information. Please follow the directions to create your username and password.     Your access code is: [email or web address]  Expires: 2018  2:53 AM     Your access code will  in 90 days. If you need help or a new code, please call your [facility] clinic or [phone].        Care EveryWhere ID     This is your Care EveryWhere ID. This could be used by other organizations to access your [facility] medical records  [id number]        Equal " Access to Services     MILLIE [facility] : Rae Pressley, alma gamez, qalenin jefferson. So [facility] [phone].    ATENCIÓN: Si habla español, tiene a muller disposición servicios gratuitos de asistencia lingüística. Llame al [phone].    We comply with applicable federal civil rights laws and [place] laws. We do not discriminate on the basis of race, color, national origin, age, disability, sex, sexual orientation, or gender identity.            After Visit Summary       This is your record. Keep this with you and show to your community pharmacist(s) and doctor(s) at your next visit.

## 2018-05-14 NOTE — DISCHARGE INSTRUCTIONS
No evidence for urinary infection.  Continue to take flomax.  Follow-up with urology if pain persists >1 week.

## 2018-05-14 NOTE — ED PROVIDER NOTES
History     Chief Complaint   Patient presents with     UTI     pt was seen last Friday for kidney stone.   pt reports urgency, hesitancy and groin pain.    pt requesting urinary test and requesting to be seen in UC.     HPI  Igor Norris is a 62 year old male who presents to urgent care requesting to have his urine tested for infection.  Patient was evaluated here on Friday 3 days ago and was diagnosed with a kidney stone.  Patient was unable to provide a urine specimen at that time and he is worried that he might also have an infection.  Patient was started on Flomax.  Reports pain has improved.  Patient continues to have intermittent episodes of urinary urgency and frequency.  Denies fever.  Denies nausea or vomiting.  Denies abdominal pain.  Denies flank pain.    Problem List:    There are no active problems to display for this patient.       Past Medical History:    Past Medical History:   Diagnosis Date     Depression      Hyperlipidemia      Hypertension        Past Surgical History:    Past Surgical History:   Procedure Laterality Date     ARTHROSCOPY KNEE  10/29/2010    ARTHROSCOPY KNEE performed by SUSIE THOMAS at [facility]     BREAST SURGERY      reduction     DISSECT LYMPH NODE INGUINAL      left     HERNIA REPAIR      umbilical     VASECTOMY       VASECTOMY      & reversal     wisdom teeth         Family History:    No family history on file.    Social History:  Marital Status:   [2]  Social History   Substance Use Topics     Smoking status: Never Smoker     Smokeless tobacco: Not on file     Alcohol use No        Medications:      ASPIRIN 81 MG PO TBEC   buPROPion (ZYBAN) 150 MG 12 hr tablet   HYDROmorphone (DILAUDID) 2 MG tablet   HYDROmorphone (DILAUDID) 2 MG tablet   LISINOPRIL-HYDROCHLOROTHIAZIDE 20-25 MG PO TABS   meclizine (ANTIVERT) 12.5 MG tablet   ondansetron (ZOFRAN ODT) 4 MG ODT tab   ondansetron (ZOFRAN ODT) 4 MG ODT tab   PRAVASTATIN SODIUM PO   tamsulosin (FLOMAX) 0.4 MG capsule          Review of Systems   Constitutional: Negative for chills and fever.   HENT: Negative for congestion.    Respiratory: Negative for cough.    Cardiovascular: Negative for chest pain.   Gastrointestinal: Negative for abdominal pain, nausea and vomiting.   Genitourinary: Positive for frequency and urgency. Negative for flank pain.   Musculoskeletal: Negative for back pain.         Physical Exam   BP: 148/90  Heart Rate: 89  Temp: 97.8  F (36.6  C)  Resp: 18  Height: 182.9 cm (6')  Weight: 113.4 kg (250 lb)  SpO2: 98 %      Physical Exam   Constitutional: He is oriented to person, place, and time. He appears well-developed and well-nourished.   HENT:   Head: Normocephalic and atraumatic.   Cardiovascular: Normal rate, regular rhythm and normal heart sounds.    Pulmonary/Chest: Effort normal and breath sounds normal.   Neurological: He is alert and oriented to person, place, and time.   Skin: Skin is warm and dry.       ED Course     ED Course     Procedures               Results for orders placed or performed during the hospital encounter of 05/14/18 (from the past 24 hour(s))   UA with Microscopic   Result Value Ref Range    Color Urine Yellow     Appearance Urine Clear     Glucose Urine Negative NEG^Negative mg/dL    Bilirubin Urine Negative NEG^Negative    Ketones Urine Negative NEG^Negative mg/dL    Specific Gravity Urine 1.020 1.003 - 1.035    Blood Urine Large (A) NEG^Negative    pH Urine 5.0 5.0 - 7.0 pH    Protein Albumin Urine Negative NEG^Negative mg/dL    Urobilinogen mg/dL 0.0 0.0 - 2.0 mg/dL    Nitrite Urine Negative NEG^Negative    Leukocyte Esterase Urine Negative NEG^Negative    Source Midstream Urine     WBC Urine 4 0 - 5 /HPF    RBC Urine >182 (H) 0 - 2 /HPF    Squamous Epithelial /HPF Urine <1 0 - 1 /HPF    Mucous Urine Present (A) NEG^Negative /LPF   Urine Culture   Result Value Ref Range    Specimen Description Midstream Urine     Special Requests Specimen received in preservative     Culture  Micro PENDING        Medications - No data to display    Assessments & Plan (with Medical Decision Making)   62-year-old male with recently diagnosed kidney stone.  Presents to urgent care requesting urinalysis to rule out infection.  Urinalysis reveals 4 WBCs, > 182 RBCs negative nitrate, negative leukocyte Estrace, and negative for bacteria.  Patient is afebrile.  I have low suspicion for urinary infection.  Urine culture is pending.  Urinalysis is consistent with active kidney stone.  I discussed the results of the urinalysis with patient.  Patient has many questions regarding kidney stone and follow-up.  Patient was instructed from his last visit and today to follow-up with urology for persistent symptoms lasting longer than 5 more days.  Continue Flomax.  Return to the emergency department for fever, vomiting, abdominal pain, or flank pain.    I have reviewed the nursing notes.    I have reviewed the findings, diagnosis, plan and need for follow up with the patient.      Discharge Medication List as of 5/14/2018  5:12 PM          Final diagnoses:   Kidney stone       5/14/2018   [facility] EMERGENCY DEPARTMENT     Christy Monroe APRN CNP  05/14/18 4095

## 2018-05-14 NOTE — ED AVS SNAPSHOT
[facility] Emergency Department    [address] [address]    Phone:  [phone]    Fax:  [phone]                                       Igor Norris   MRN: [id number]    Department:  [facility] Emergency Department   Date of Visit:  5/14/2018           After Visit Summary Signature Page     I have received my discharge instructions, and my questions have been answered. I have discussed any challenges I see with this plan with the nurse or doctor.    ..........................................................................................................................................  Patient/Patient Representative Signature      ..........................................................................................................................................  Patient Representative Print Name and Relationship to Patient    ..................................................               ................................................  Date                                            Time    ..........................................................................................................................................  Reviewed by Signature/Title    ...................................................              ..............................................  Date                                                            Time

## 2018-05-15 LAB
BACTERIA SPEC CULT: NO GROWTH
Lab: NORMAL
SPECIMEN SOURCE: NORMAL

## 2020-10-12 ENCOUNTER — HOSPITAL ENCOUNTER (EMERGENCY)
Facility: CLINIC | Age: 65
Discharge: HOME | End: 2020-10-12
Attending: NURSE PRACTITIONER | Admitting: NURSE PRACTITIONER
Payer: COMMERCIAL

## 2020-10-12 VITALS
BODY MASS INDEX: 33.91 KG/M2 | DIASTOLIC BLOOD PRESSURE: 90 MMHG | OXYGEN SATURATION: 99 % | HEART RATE: 90 BPM | WEIGHT: 250 LBS | RESPIRATION RATE: 14 BRPM | SYSTOLIC BLOOD PRESSURE: 148 MMHG

## 2020-10-12 DIAGNOSIS — S61.012A LACERATION OF LEFT THUMB WITHOUT FOREIGN BODY WITHOUT DAMAGE TO NAIL, INITIAL ENCOUNTER: ICD-10-CM

## 2020-10-12 PROCEDURE — 12001 RPR S/N/AX/GEN/TRNK 2.5CM/<: CPT | Performed by: NURSE PRACTITIONER

## 2020-10-12 PROCEDURE — 99213 OFFICE O/P EST LOW 20 MIN: CPT | Mod: 25 | Performed by: NURSE PRACTITIONER

## 2020-10-12 PROCEDURE — G0463 HOSPITAL OUTPT CLINIC VISIT: HCPCS | Performed by: NURSE PRACTITIONER

## 2020-10-12 RX ORDER — LOSARTAN POTASSIUM 100 MG/1
100 TABLET ORAL DAILY
COMMUNITY

## 2020-10-12 NOTE — ED PROVIDER NOTES
History     Chief Complaint   Patient presents with     Laceration     sm chain saw laceration to left thumb sm amt of bleeding new drsg applied     HPI  Igor Norris is a 65 year old male who presents to urgent care with a left thumb laceration secondary to trauma from an electric chainsaw.  Patient reports he was trimming some branches and his thumb got caught in the chain saw.  Patient denies loss of range of motion or sensation.  Patient reports minimal pain presently.  Patient reports difficulty controlling the bleeding.  Patient denies fever, aches, chills, sweats, ear pain, eye pain, throat pain, chest pain, cough, wheezing, shortness of breath, abdominal pain, nausea, vomiting, diarrhea, dysuria, speech difficulty, left or right-sided body weakness, mental confusion, thoughts of harming self.  Patient reports feeling well otherwise    Allergies:  Allergies   Allergen Reactions     Hydrocodone Other (See Comments)     External ear pain     Chickens [Feathers]      Morphine Sulfate        Problem List:    There are no active problems to display for this patient.       Past Medical History:    Past Medical History:   Diagnosis Date     Depression      Hyperlipidemia      Hypertension        Past Surgical History:    Past Surgical History:   Procedure Laterality Date     ARTHROSCOPY KNEE  10/29/2010    ARTHROSCOPY KNEE performed by SUSIE THOMAS at [facility]     BREAST SURGERY      reduction     DISSECT LYMPH NODE INGUINAL      left     HERNIA REPAIR      umbilical     VASECTOMY       VASECTOMY      & reversal     wisdom teeth         Family History:    No family history on file.    Social History:  Marital Status:   [2]  Social History     Tobacco Use     Smoking status: Never Smoker   Substance Use Topics     Alcohol use: No     Drug use: No        Medications:         FLUoxetine (PROZAC) 20 MG capsule       losartan (COZAAR) 25 MG tablet       Aspirin Buf,CaCarb-MgCarb-MgO, 81 MG TABS        "PRAVASTATIN SODIUM PO          Review of Systems  As mentioned above in the history present illness. All other systems were reviewed and are negative.    Physical Exam   BP: (!) 148/90  Pulse: 90  Resp: 14  Weight: 113.4 kg (250 lb)  SpO2: 99 %      Physical Exam  Vitals signs and nursing note reviewed.   Constitutional:       General: He is not in acute distress.     Appearance: He is well-developed. He is not diaphoretic.   HENT:      Head: Normocephalic and atraumatic.      Right Ear: External ear normal.      Left Ear: External ear normal.      Nose: Nose normal.   Eyes:      General:         Right eye: No discharge.         Left eye: No discharge.      Conjunctiva/sclera: Conjunctivae normal.   Cardiovascular:      Rate and Rhythm: Normal rate and regular rhythm.      Heart sounds: Normal heart sounds. No murmur. No friction rub. No gallop.    Pulmonary:      Effort: Pulmonary effort is normal.      Breath sounds: Normal breath sounds. No stridor. No wheezing.   Abdominal:      Tenderness: There is no abdominal tenderness.   Musculoskeletal:      Right shoulder: He exhibits laceration (volar lateral mid left thumb - 1.5 cm with irregular borders and tissue fragments due to chain saw blade. without tendon involvement - ROM normal, sensation normal).   Skin:     General: Skin is warm.      Findings: No rash.   Neurological:      Mental Status: He is alert and oriented to person, place, and time.         ED Course        Procedures    [facility] Procedure Note          Laceration Repair:      Laceration repair  Performed by: SILVINO Dalal CNP  Authorized by: SILVINO Dalal CNP  Consent: Verbal consent obtained.  Risks and benefits: risks, benefits and alternatives were discussed  Patient understanding: patient states understanding of the procedure being performed  Site marked: the operative site was identified  Time out: Immediately prior to procedure a \"time out\" was called to verify the " correct patient, procedure, equipment, support staff and site/side marked as required.    Preparation: Patient was prepped and draped in usual sterile fashion.  Irrigation solution: saline    Body area: left thumb  Laceration length: 1.5 cm by 4 mm depth  Contamination: The wound is not contaminated.  Foreign bodies:none  Tendon involvement:none  Anesthesia: Localinfiltration  Local anesthetic: Bupivacaine 0.25% without epinephrine and without buffer.  Anesthetic total: 3ml    Debridement: irrigated with water and hibiclens  Skin closure:A total of 3  3-0 Ethylon  Technique: interrupted  Approximation: close  Approximation difficulty: simple      Patient tolerance: Patient tolerated the procedure well with no immediate complications.    No results found for this or any previous visit (from the past 24 hour(s)).    Medications - No data to display    Assessments & Plan (with Medical Decision Making)     I have reviewed the nursing notes.    I have reviewed the findings, diagnosis, plan and need for follow up with the patient.    Discharge Medication List as of 10/12/2020  5:49 PM          Final diagnoses:   Laceration of left thumb without foreign body without damage to nail, initial encounter - 3 sutures       10/12/2020   [facility] EMERGENCY DEPT     Sunshine Manriquez, SILVINO CNP  10/12/20 0644

## 2020-10-12 NOTE — ED AVS SNAPSHOT
[facility] Emergency Dept  [address] [address]  Phone: [phone]  Fax: [phone]                                    Igor Norris   MRN: [id number]    Department: [facility] Emergency Dept   Date of Visit: 10/12/2020           After Visit Summary Signature Page    I have received my discharge instructions, and my questions have been answered. I have discussed any challenges I see with this plan with the nurse or doctor.    ..........................................................................................................................................  Patient/Patient Representative Signature      ..........................................................................................................................................  Patient Representative Print Name and Relationship to Patient    ..................................................               ................................................  Date                                   Time    ..........................................................................................................................................  Reviewed by Signature/Title    ...................................................              ..............................................  Date                                               Time          22EPIC Rev 08/18

## 2020-10-12 NOTE — DISCHARGE INSTRUCTIONS
Follow-up in 8 to 10 days for suture removal.  Leave the Band-Aid on until tomorrow morning or afternoon and then remove and wash and pat dry apply Vaseline twice daily until wound is well-healed.  Return if signs of infection.  3 sutures placed.

## 2021-01-08 ENCOUNTER — HOSPITAL ENCOUNTER (OUTPATIENT)
Dept: PHYSICAL THERAPY | Facility: CLINIC | Age: 66
Setting detail: THERAPIES SERIES
End: 2021-01-08
Attending: FAMILY MEDICINE
Payer: COMMERCIAL

## 2021-01-08 PROCEDURE — 97161 PT EVAL LOW COMPLEX 20 MIN: CPT | Mod: GP | Performed by: PHYSICAL THERAPIST

## 2021-01-08 PROCEDURE — 97110 THERAPEUTIC EXERCISES: CPT | Mod: GP | Performed by: PHYSICAL THERAPIST

## 2021-01-08 PROCEDURE — 97140 MANUAL THERAPY 1/> REGIONS: CPT | Mod: GP | Performed by: PHYSICAL THERAPIST

## 2021-01-15 ENCOUNTER — HEALTH MAINTENANCE LETTER (OUTPATIENT)
Age: 66
End: 2021-01-15

## 2021-01-15 ENCOUNTER — HOSPITAL ENCOUNTER (OUTPATIENT)
Dept: PHYSICAL THERAPY | Facility: CLINIC | Age: 66
Setting detail: THERAPIES SERIES
End: 2021-01-15
Attending: FAMILY MEDICINE
Payer: COMMERCIAL

## 2021-01-15 PROCEDURE — 97140 MANUAL THERAPY 1/> REGIONS: CPT | Mod: GP | Performed by: PHYSICAL THERAPIST

## 2021-01-15 PROCEDURE — 97110 THERAPEUTIC EXERCISES: CPT | Mod: GP | Performed by: PHYSICAL THERAPIST

## 2021-01-22 ENCOUNTER — HOSPITAL ENCOUNTER (OUTPATIENT)
Dept: PHYSICAL THERAPY | Facility: CLINIC | Age: 66
Setting detail: THERAPIES SERIES
End: 2021-01-22
Attending: FAMILY MEDICINE
Payer: COMMERCIAL

## 2021-01-22 PROCEDURE — 97140 MANUAL THERAPY 1/> REGIONS: CPT | Mod: GP | Performed by: PHYSICAL THERAPIST

## 2021-01-22 NOTE — PROGRESS NOTES
"  [facility]gor [facility]  1955  Diagnosis - Bilateral knee pain osteoarthritis  Physical Therapy Discharge Note  01/22/21 1400   Signing Clinician's Name / Credentials   Signing clinician's name / credentials Freddie Andrew, PT, DPT   Session Number   Session Number 3 (Physical therapy date range - 1/8/2021 - 1/22/2021)   Progress Note/Recertification   Progress Note Due Date 03/05/21   Progress Note Completed Date 01/22/21   Adult Goals   PT Ortho Eval Goals 1;2;3;4   Ortho Goal 1   Goal Identifier HEP   Goal Description Pt will be independent in HEP in order to achieve and maintain long term treatment goals.   Target Date 02/08/21   Date Met 01/22/21   Ortho Goal 2   Goal Identifier Ambulation   Goal Description Pt will be able to ambulate 3-4 miles with a minimal increase in pain 1-2/10.   Target Date 03/05/21   Date Met 01/22/21   Ortho Goal 3   Goal Identifier Skiing   Goal Description Pt will be able to ski with a minimal increase in knee pain 1-2/10.  (Has not attempted, conditions not good.)   Target Date 03/05/21   Ortho Goal 4   Goal Identifier Downhill   Goal Description Pt will be able to walk downhill with a minimal increase in pain 1-2/10.   Target Date 03/05/21   Date Met 01/22/21   Subjective Report   Subjective Report \"Things are feeling better and wondering if needs to come in anymore.\"  80% improvement since beginning physical therapy.   Objective Measures   Objective Measures Objective Measure 1;Objective Measure 2;Objective Measure 3   Objective Measure 1   Objective Measure Palpation   Details  Hypomobility of tibiofemoral joints B reduced from previous visit   Objective Measure 2   Objective Measure LEFS   Details 53/80   Objective Measure 3   Objective Measure Strength   Details Hip flexion - 5/5 B / Knee extension - 5/5 B / Knee flexion - 5/5 B / Hip abduction - 4/5 B / Hip adduction - 5/5 B   Treatment Interventions   Interventions Therapeutic Procedure/Exercise;Manual Therapy   Manual Therapy "   Manual Therapy: Mobilization, MFR, MLD, friction massage minutes (92120) 25   Skilled Intervention Joint mobilizations   Patient Response Improved motion   Treatment Detail AP tibiofemoral joint mobilizations grades 1-3 to improve motion and redue pain B / Knee scoop to improve motion and reduce pain B / Long axis hip distraction to improve motion and reduce pain B   Plan   Home program [id number] / Side steps Piriformis stretch 2x30 seconds / HS stretch 2x30 seconds / Gastroc stretch 2x30 seconds / SLR in supine x10 B with 5 second holds   Updates to plan of care Discharged   Comments   Comments Pt has done well in physical therapy and has met 3 of 4 goals. Pt states that he has seen an 80% improvement since beginning physical therapy and would like to manage symptoms independently. Pt will be discharged to [facility] at this time.   Total Session Time   Timed Code Treatment Minutes 25   Total Treatment Time (sum of timed and untimed services) 25   AMBULATORY CLINICS ONLY-MEDICAL AND TREATMENT DIAGNOSIS   PT Diagnosis Bilateral knee pain     Referring Physician - Bill De Jesus MD

## 2021-01-22 NOTE — PROGRESS NOTES
Igor Norris  1955 Physical Therapy Initial Evaluation  01/08/21 1400   General Information   Type of Visit Initial OP Ortho PT Evaluation   Start of Care Date 01/08/21   Referring Physician Bill De Jesus MD   Patient/Family Goals Statement Walk without pain   Orders Evaluate and Treat   Date of Order 12/28/20   Certification Required? No   Medical Diagnosis Bilateral knee pain osteoarthritis   Surgical/Medical history reviewed Yes   Precautions/Limitations no known precautions/limitations   Body Part(s)   Body Part(s) Knee   Presentation and Etiology   Pertinent history of current problem (include personal factors and/or comorbidities that impact the POC) Has OA in both knees and was told that he needs TKA on both sides. Has put on a lot of weight with COVID. Has been walking 3-4 miles lately for 1 month and it depends on the day as to what his knees feel like. Uses elliptical if can't get out to walk. Gets pain above and below the knee joint on both knees. Has been using knee braces and a topical medication, which has helped some. / Comorbidities - Depression, HTN    Impairments A. Pain;K. Numbness;L. Tingling   Functional Limitations perform activities of daily living;perform desired leisure / sports activities   Symptom Location Builateral knees   How/Where did it occur From insidious onset   Onset date of current episode/exacerbation 12/28/20  (Date of order)   Chronicity Chronic   Pain rating (0-10 point scale) Best (/10);Worst (/10)   Best (/10) 0   Worst (/10) 8   Pain quality C. Aching;D. Burning   Frequency of pain/symptoms B. Intermittent   Pain/symptoms exacerbated by M. Other   Pain exacerbation comment Standing still   Pain/symptoms eased by A. Sitting   Progression of symptoms since onset: Worsened   Prior Level of Function   Functional Level Prior Comment Independent in ADLs   Current Level of Function   Patient role/employment history F. Retired   Living environment House/townhome    Home/community accessibility 0 stairs   Current equipment-Gait/Locomotion None   Fall Risk Screen   Fall screen completed by PT   Have you fallen 2 or more times in the past year? No   Have you fallen and had an injury in the past year? No   Is patient a fall risk? No   Abuse Screen (yes response referral indicated)   Feels Unsafe at Home or Work/School no   Feels Threatened by Someone no   Does Anyone Try to Keep You From Having Contact with Others or Doing Things Outside Your Home? no   Physical Signs of Abuse Present no   Knee Objective Findings   Side (if bilateral, select both right and left) Right   Observation SIgnificant knee varus on left   Gait/Locomotion Good heel strike and toe off / Good lumbar rotation   Anterior Drawer Test Negative   Posterior Drawer Test Negative   Varus Stress Test Negative   Valgus Stress Test Negative   Apley's Test Negative   Apprehension Test Negative   Palpation Tender to palpation over medial joint line on right   Right Knee Extension AROM 0 B   Right Knee Flexion PROM 130 B   Right Knee Flexion Strength 4/5 B   Right Knee Extension Strength 4/5 B   Right Hip Abduction Strength 4/5 B   Right Gastrocnemius Flexibility Moderately restricted   Right Hamstring Flexibility Moderately restricted on left   Planned Therapy Interventions   Planned Therapy Interventions joint mobilization;manual therapy;neuromuscular re-education;ROM;strengthening;stretching   Planned Modality Interventions   Planned Modality Interventions Cryotherapy;Hot packs;Ultrasound;TENS;Electrical stimulation   Clinical Impression   Criteria for Skilled Therapeutic Interventions Met yes, treatment indicated   PT Diagnosis Bilateral knee pain   Influenced by the following impairments Pain, Strength deficits   Functional limitations due to impairments Difficulty with ambulation, ambulating downhill   Clinical Presentation Stable/Uncomplicated   Clinical Presentation Rationale 2 comorbidities impacting PT / 1-2  body systems   Clinical Decision Making (Complexity) Low complexity   Therapy Frequency 1 time/week   Predicted Duration of Therapy Intervention (days/wks) 8 weeks   Risk & Benefits of therapy have been explained Yes   Patient, Family & other staff in agreement with plan of care Yes   Education Assessment   Preferred Learning Style Listening;Reading;Pictures/video;Demonstration   Barriers to Learning No barriers   ORTHO GOALS   PT Ortho Eval Goals 1;2;3;4   Ortho Goal 1   Goal Identifier HEP   Goal Description Pt will be independent in HEP in order to achieve and maintain long term treatment goals.   Target Date 02/08/21   Ortho Goal 2   Goal Identifier Ambulation   Goal Description Pt will be able to ambulate 3-4 miles with a minimal increase in pain 1-2/10.   Target Date 03/05/21   Ortho Goal 3   Goal Identifier Skiing   Goal Description Pt will be able to ski with a minimal increase in knee pain 1-2/10.   Target Date 03/05/21   Ortho Goal 4   Goal Identifier Downhill   Goal Description Pt will be able to walk downhill with a minimal increase in pain 1-2/10.   Target Date 03/05/21   Total Evaluation Time   PT Eval, Low Complexity Minutes (47531) 22     Freddie Andrew, PT, DPT

## 2021-06-02 ENCOUNTER — RECORDS - HEALTHEAST (OUTPATIENT)
Dept: ADMINISTRATIVE | Facility: CLINIC | Age: 66
End: 2021-06-02

## 2021-09-18 ENCOUNTER — HEALTH MAINTENANCE LETTER (OUTPATIENT)
Age: 66
End: 2021-09-18

## 2022-03-05 ENCOUNTER — HEALTH MAINTENANCE LETTER (OUTPATIENT)
Age: 67
End: 2022-03-05

## 2022-11-19 ENCOUNTER — HEALTH MAINTENANCE LETTER (OUTPATIENT)
Age: 67
End: 2022-11-19

## 2023-02-07 ENCOUNTER — TRANSFERRED RECORDS (OUTPATIENT)
Dept: MULTI SPECIALTY CLINIC | Facility: CLINIC | Age: 68
End: 2023-02-07

## 2023-02-07 LAB
ALT SERPL-CCNC: 33 U/L
AST SERPL-CCNC: 17 U/L
CREATININE (EXTERNAL): 0.8 MG/DL (ref 0.7–1.2)
GFR ESTIMATED (EXTERNAL): >90 ML/MIN/1.73M2
GLUCOSE (EXTERNAL): 139 MG/DL (ref 70–100)
HBA1C MFR BLD: 7.1 % (ref 4–6)
POTASSIUM (EXTERNAL): 4.3 MMOL/L (ref 3.5–5.1)

## 2023-02-20 ENCOUNTER — ANESTHESIA EVENT (OUTPATIENT)
Dept: SURGERY | Facility: CLINIC | Age: 68
End: 2023-02-20
Payer: COMMERCIAL

## 2023-02-20 RX ORDER — LATANOPROST 50 UG/ML
1 SOLUTION/ DROPS OPHTHALMIC AT BEDTIME
COMMUNITY

## 2023-02-20 RX ORDER — VITAMIN B COMPLEX
1 TABLET ORAL DAILY
COMMUNITY

## 2023-02-20 RX ORDER — ASPIRIN 81 MG/1
81 TABLET ORAL DAILY
Status: ON HOLD | COMMUNITY
End: 2023-02-22

## 2023-02-20 RX ORDER — METFORMIN HCL 500 MG
500 TABLET, EXTENDED RELEASE 24 HR ORAL DAILY
COMMUNITY

## 2023-02-20 NOTE — ANESTHESIA PREPROCEDURE EVALUATION
"Anesthesia Pre-Procedure Evaluation    Patient: Igor Norris   MRN: [id number] : 1955        Procedure : Procedure(s):  LEFT TOTAL KNEE ARTHROPLASTY          Past Medical History:   Diagnosis Date     Coronary artery disease      Depression      Hyperlipidemia      Hypertension       Past Surgical History:   Procedure Laterality Date     ARTHROSCOPY KNEE  10/29/2010    ARTHROSCOPY KNEE performed by SUSIE THOMAS at [facility]     BREAST SURGERY      reduction     DISSECT LYMPH NODE INGUINAL      left     HERNIA REPAIR      umbilical     VASECTOMY       VASECTOMY      & reversal     wisdom teeth        Allergies   Allergen Reactions     Hydrocodone Other (See Comments)     External ear pain     Chickens [Feathers]      Morphine Sulfate       Social History     Tobacco Use     Smoking status: Never     Smokeless tobacco: Not on file   Substance Use Topics     Alcohol use: No      Wt Readings from Last 1 Encounters:   10/12/20 113.4 kg (250 lb)        Anesthesia Evaluation   Pt has had prior anesthetic.     No history of anesthetic complications       ROS/MED HX  ENT/Pulmonary:    (-) sleep apnea   Neurologic:       Cardiovascular:     (+) Dyslipidemia hypertension-range: controlled/ -CAD (per patient \"minor heart attack\" , no intervension required and attributed to lifestyle) ---    METS/Exercise Tolerance: >4 METS    Hematologic:       Musculoskeletal:   (+) arthritis,     GI/Hepatic:    (-) GERD   Renal/Genitourinary:       Endo:     (+) type II DM (recent diagnosis with elevated A1C.  Metformin started), Not using insulin, Obesity (BMI 35),     Psychiatric/Substance Use:       Infectious Disease:       Malignancy:       Other:            Physical Exam    Airway        Mallampati: II   TM distance: > 3 FB   Neck ROM: full   Mouth opening: > 3 cm    Respiratory Devices and Support         Dental       (+) Modest Abnormalities - crowns, retainers, 1 or 2 missing teeth      Cardiovascular   cardiovascular exam " normal          Pulmonary   pulmonary exam normal                OUTSIDE LABS:  CBC:   Lab Results   Component Value Date    WBC 10.9 05/11/2018    WBC 6.8 03/02/2008    HGB 15.0 05/11/2018    HGB 15.4 03/02/2008    HCT 42.7 05/11/2018    HCT 44.9 03/02/2008     05/11/2018     03/02/2008     BMP:   Lab Results   Component Value Date     05/11/2018     03/02/2008    POTASSIUM 4.0 05/11/2018    POTASSIUM 4.1 10/29/2010    CHLORIDE 105 05/11/2018    CHLORIDE 110 (H) 03/02/2008    CO2 27 05/11/2018    CO2 24 03/02/2008    BUN 22 05/11/2018    BUN 12 03/02/2008    CR 1.13 05/11/2018    CR 0.81 03/02/2008     (H) 05/11/2018     (H) 10/29/2010     COAGS: No results found for: PTT, INR, FIBR  POC:   Lab Results   Component Value Date     (H) 06/01/2010     HEPATIC:   Lab Results   Component Value Date    ALBUMIN 4.1 05/11/2018    PROTTOTAL 7.5 05/11/2018    ALT 63 05/11/2018    AST 28 05/11/2018    ALKPHOS 69 05/11/2018    BILITOTAL 0.3 05/11/2018     OTHER:   Lab Results   Component Value Date    YVROSE 9.2 05/11/2018    MAG 2.3 03/02/2008    TSH 2.12 03/02/2008    T4 1.36 03/02/2008       Anesthesia Plan    ASA Status:  3   NPO Status:  NPO Appropriate    Anesthesia Type: Spinal.              Consents    Anesthesia Plan(s) and associated risks, benefits, and realistic alternatives discussed. Questions answered and patient/representative(s) expressed understanding.    - Discussed:     - Discussed with:  Patient         Postoperative Care    Pain management: IV analgesics, Multi-modal analgesia, Peripheral nerve block (Single Shot).   PONV prophylaxis: Ondansetron (or other 5HT-3)     Comments:    Other Comments: H&P reviewed    Avoid decadron IV with DM2.  Surgeon requests 4mg decadron in PNB, aware of DM2    Check preop blood glucose    Avoid opioids in OR.  No dilaudid in OR or PACU per patient request            Mark Dubois MD

## 2023-02-20 NOTE — PROGRESS NOTES
PTA medications updated by Medication Scribe prior to surgery via phone call with patient (last doses completed by Nurse)     Medication history sources: Patient and H&P  In the past week, patient estimated taking medication this percent of the time: Greater than 90%  Adherence assessment: N/A Not Observed    Significant changes made to the medication list:  None      Additional medication history information:   None    Medication reconciliation completed by provider prior to medication history? No    Time spent in this activity: 30 MINUTES    The information provided in this note is only as accurate as the sources available at the time of update(s)      Prior to Admission medications    Medication Sig Last Dose Taking? Auth Provider Long Term End Date   aspirin 81 MG EC tablet Take 81 mg by mouth daily  Yes Reported, Patient No    doxylamine (KLS SLEEP AID) 25 MG TABS tablet Take 12.5-25 mg by mouth At Bedtime  at PM Yes Reported, Patient     FLUoxetine (PROZAC) 20 MG capsule Take 40 mg by mouth daily (20 mg x 2 = 40 mg)  at AM Yes Reported, Patient Yes    latanoprost (XALATAN) 0.005 % ophthalmic solution Place 1 drop Into the left eye At Bedtime  at PM Yes Reported, Patient Yes    losartan (COZAAR) 100 MG tablet Take 100 mg by mouth daily  at AM Yes Reported, Patient Yes    metFORMIN (GLUCOPHAGE XR) 500 MG 24 hr tablet Take 500 mg by mouth daily  at AM Yes Reported, Patient Yes    pravastatin (PRAVACHOL) 80 MG tablet Take 80 mg by mouth every evening  at PM Yes Reported, Patient Yes    Vitamin D3 (CHOLECALCIFEROL) 25 mcg (1000 units) tablet Take 1 tablet by mouth daily  at AM Yes Reported, Patient

## 2023-02-21 ENCOUNTER — ANESTHESIA (OUTPATIENT)
Dept: SURGERY | Facility: CLINIC | Age: 68
End: 2023-02-21
Payer: COMMERCIAL

## 2023-02-21 ENCOUNTER — APPOINTMENT (OUTPATIENT)
Dept: PHYSICAL THERAPY | Facility: CLINIC | Age: 68
End: 2023-02-21
Attending: ORTHOPAEDIC SURGERY
Payer: COMMERCIAL

## 2023-02-21 ENCOUNTER — HOSPITAL ENCOUNTER (OUTPATIENT)
Facility: CLINIC | Age: 68
Discharge: HOME | End: 2023-02-22
Attending: ORTHOPAEDIC SURGERY | Admitting: ORTHOPAEDIC SURGERY
Payer: COMMERCIAL

## 2023-02-21 ENCOUNTER — APPOINTMENT (OUTPATIENT)
Dept: GENERAL RADIOLOGY | Facility: CLINIC | Age: 68
End: 2023-02-21
Attending: PHYSICIAN ASSISTANT
Payer: COMMERCIAL

## 2023-02-21 DIAGNOSIS — Z98.890 POST-OPERATIVE STATE: Primary | ICD-10-CM

## 2023-02-21 LAB
CREAT SERPL-MCNC: 0.9 MG/DL (ref 0.67–1.17)
FASTING STATUS PATIENT QL REPORTED: YES
GFR SERPL CREATININE-BSD FRML MDRD: >90 ML/MIN/1.73M2
GLUCOSE BLDC GLUCOMTR-MCNC: 165 MG/DL (ref 70–99)
GLUCOSE SERPL-MCNC: 153 MG/DL (ref 70–99)
POTASSIUM SERPL-SCNC: 3.9 MMOL/L (ref 3.4–5.3)

## 2023-02-21 PROCEDURE — C1776 JOINT DEVICE (IMPLANTABLE): HCPCS | Performed by: ORTHOPAEDIC SURGERY

## 2023-02-21 PROCEDURE — 258N000003 HC RX IP 258 OP 636: Performed by: ANESTHESIOLOGY

## 2023-02-21 PROCEDURE — 258N000001 HC RX 258: Performed by: ORTHOPAEDIC SURGERY

## 2023-02-21 PROCEDURE — 250N000011 HC RX IP 250 OP 636: Performed by: ORTHOPAEDIC SURGERY

## 2023-02-21 PROCEDURE — 250N000011 HC RX IP 250 OP 636: Performed by: PHYSICIAN ASSISTANT

## 2023-02-21 PROCEDURE — 82962 GLUCOSE BLOOD TEST: CPT

## 2023-02-21 PROCEDURE — 250N000011 HC RX IP 250 OP 636: Performed by: NURSE ANESTHETIST, CERTIFIED REGISTERED

## 2023-02-21 PROCEDURE — 999N000141 HC STATISTIC PRE-PROCEDURE NURSING ASSESSMENT: Performed by: ORTHOPAEDIC SURGERY

## 2023-02-21 PROCEDURE — 250N000013 HC RX MED GY IP 250 OP 250 PS 637: Performed by: PHYSICIAN ASSISTANT

## 2023-02-21 PROCEDURE — 272N000001 HC OR GENERAL SUPPLY STERILE: Performed by: ORTHOPAEDIC SURGERY

## 2023-02-21 PROCEDURE — 99207 PR NO BILLABLE SERVICE THIS VISIT: CPT | Performed by: PHYSICIAN ASSISTANT

## 2023-02-21 PROCEDURE — 250N000011 HC RX IP 250 OP 636: Performed by: ANESTHESIOLOGY

## 2023-02-21 PROCEDURE — 258N000003 HC RX IP 258 OP 636: Performed by: NURSE ANESTHETIST, CERTIFIED REGISTERED

## 2023-02-21 PROCEDURE — 710N000009 HC RECOVERY PHASE 1, LEVEL 1, PER MIN: Performed by: ORTHOPAEDIC SURGERY

## 2023-02-21 PROCEDURE — 258N000003 HC RX IP 258 OP 636: Performed by: PHYSICIAN ASSISTANT

## 2023-02-21 PROCEDURE — 82947 ASSAY GLUCOSE BLOOD QUANT: CPT | Mod: 91 | Performed by: ANESTHESIOLOGY

## 2023-02-21 PROCEDURE — 250N000009 HC RX 250: Performed by: ORTHOPAEDIC SURGERY

## 2023-02-21 PROCEDURE — 36415 COLL VENOUS BLD VENIPUNCTURE: CPT | Performed by: ANESTHESIOLOGY

## 2023-02-21 PROCEDURE — 84132 ASSAY OF SERUM POTASSIUM: CPT | Performed by: ANESTHESIOLOGY

## 2023-02-21 PROCEDURE — 97116 GAIT TRAINING THERAPY: CPT | Mod: GP

## 2023-02-21 PROCEDURE — C1713 ANCHOR/SCREW BN/BN,TIS/BN: HCPCS | Performed by: ORTHOPAEDIC SURGERY

## 2023-02-21 PROCEDURE — 97530 THERAPEUTIC ACTIVITIES: CPT | Mod: GP

## 2023-02-21 PROCEDURE — 360N000077 HC SURGERY LEVEL 4, PER MIN: Performed by: ORTHOPAEDIC SURGERY

## 2023-02-21 PROCEDURE — 250N000009 HC RX 250: Performed by: ANESTHESIOLOGY

## 2023-02-21 PROCEDURE — 97161 PT EVAL LOW COMPLEX 20 MIN: CPT | Mod: GP

## 2023-02-21 PROCEDURE — 370N000017 HC ANESTHESIA TECHNICAL FEE, PER MIN: Performed by: ORTHOPAEDIC SURGERY

## 2023-02-21 PROCEDURE — 999N000063 XR KNEE PORT LEFT 1/2 VIEWS: Mod: LT

## 2023-02-21 PROCEDURE — 82565 ASSAY OF CREATININE: CPT | Performed by: ORTHOPAEDIC SURGERY

## 2023-02-21 DEVICE — IMP COMP FEMORAL S&N LEGION PS NP SZ7 LT 71423227: Type: IMPLANTABLE DEVICE | Site: KNEE | Status: FUNCTIONAL

## 2023-02-21 DEVICE — GII OVAL RESURFACING PATELLAR  41MM
Type: IMPLANTABLE DEVICE | Site: KNEE | Status: FUNCTIONAL
Brand: GENESIS II

## 2023-02-21 DEVICE — IMPLANTABLE DEVICE: Type: IMPLANTABLE DEVICE | Site: KNEE | Status: FUNCTIONAL

## 2023-02-21 DEVICE — GENESIS II NON-POROUS TIBIAL                                    BASEPLATE SIZE 8 LEFT
Type: IMPLANTABLE DEVICE | Site: KNEE | Status: FUNCTIONAL
Brand: GENESIS II

## 2023-02-21 DEVICE — FULL DOSE BONE CEMENT, 10 PACK CATALOG NUMBER IS 6191-1-010
Type: IMPLANTABLE DEVICE | Site: KNEE | Status: FUNCTIONAL
Brand: SIMPLEX

## 2023-02-21 RX ORDER — TRANEXAMIC ACID 650 MG/1
1950 TABLET ORAL ONCE
Status: COMPLETED | OUTPATIENT
Start: 2023-02-21 | End: 2023-02-21

## 2023-02-21 RX ORDER — ACETAMINOPHEN 325 MG/1
975 TABLET ORAL EVERY 8 HOURS
Status: DISCONTINUED | OUTPATIENT
Start: 2023-02-21 | End: 2023-02-22 | Stop reason: HOSPADM

## 2023-02-21 RX ORDER — ONDANSETRON 2 MG/ML
4 INJECTION INTRAMUSCULAR; INTRAVENOUS EVERY 6 HOURS PRN
Status: DISCONTINUED | OUTPATIENT
Start: 2023-02-21 | End: 2023-02-22 | Stop reason: HOSPADM

## 2023-02-21 RX ORDER — ASPIRIN 81 MG/1
162 TABLET ORAL DAILY
Qty: 120 TABLET | Refills: 0 | Status: SHIPPED | OUTPATIENT
Start: 2023-02-21

## 2023-02-21 RX ORDER — AMOXICILLIN 250 MG
1 CAPSULE ORAL 2 TIMES DAILY
Status: DISCONTINUED | OUTPATIENT
Start: 2023-02-21 | End: 2023-02-22 | Stop reason: HOSPADM

## 2023-02-21 RX ORDER — BISACODYL 10 MG
10 SUPPOSITORY, RECTAL RECTAL DAILY PRN
Status: DISCONTINUED | OUTPATIENT
Start: 2023-02-21 | End: 2023-02-22 | Stop reason: HOSPADM

## 2023-02-21 RX ORDER — ONDANSETRON 4 MG/1
4 TABLET, ORALLY DISINTEGRATING ORAL EVERY 6 HOURS PRN
Status: DISCONTINUED | OUTPATIENT
Start: 2023-02-21 | End: 2023-02-22 | Stop reason: HOSPADM

## 2023-02-21 RX ORDER — LOSARTAN POTASSIUM 100 MG/1
100 TABLET ORAL DAILY
Status: DISCONTINUED | OUTPATIENT
Start: 2023-02-22 | End: 2023-02-22 | Stop reason: HOSPADM

## 2023-02-21 RX ORDER — CEFAZOLIN SODIUM/WATER 2 G/20 ML
2 SYRINGE (ML) INTRAVENOUS
Status: COMPLETED | OUTPATIENT
Start: 2023-02-21 | End: 2023-02-21

## 2023-02-21 RX ORDER — HYDROMORPHONE HCL IN WATER/PF 6 MG/30 ML
0.4 PATIENT CONTROLLED ANALGESIA SYRINGE INTRAVENOUS EVERY 5 MIN PRN
Status: DISCONTINUED | OUTPATIENT
Start: 2023-02-21 | End: 2023-02-21

## 2023-02-21 RX ORDER — POLYETHYLENE GLYCOL 3350 17 G/17G
17 POWDER, FOR SOLUTION ORAL DAILY
Status: DISCONTINUED | OUTPATIENT
Start: 2023-02-22 | End: 2023-02-22 | Stop reason: HOSPADM

## 2023-02-21 RX ORDER — CHLOROPROCAINE HYDROCHLORIDE 20 MG/ML
INJECTION, SOLUTION EPIDURAL; INFILTRATION; INTRACAUDAL; PERINEURAL
Status: COMPLETED | OUTPATIENT
Start: 2023-02-21 | End: 2023-02-21

## 2023-02-21 RX ORDER — NALOXONE HYDROCHLORIDE 0.4 MG/ML
0.4 INJECTION, SOLUTION INTRAMUSCULAR; INTRAVENOUS; SUBCUTANEOUS
Status: DISCONTINUED | OUTPATIENT
Start: 2023-02-21 | End: 2023-02-22 | Stop reason: HOSPADM

## 2023-02-21 RX ORDER — DIPHENHYDRAMINE HCL 12.5MG/5ML
12.5 LIQUID (ML) ORAL EVERY 6 HOURS PRN
Status: DISCONTINUED | OUTPATIENT
Start: 2023-02-21 | End: 2023-02-22 | Stop reason: HOSPADM

## 2023-02-21 RX ORDER — VANCOMYCIN HYDROCHLORIDE 1 G/20ML
INJECTION, POWDER, LYOPHILIZED, FOR SOLUTION INTRAVENOUS PRN
Status: DISCONTINUED | OUTPATIENT
Start: 2023-02-21 | End: 2023-02-21 | Stop reason: HOSPADM

## 2023-02-21 RX ORDER — DEXAMETHASONE SODIUM PHOSPHATE 10 MG/ML
INJECTION, SOLUTION INTRAMUSCULAR; INTRAVENOUS
Status: COMPLETED | OUTPATIENT
Start: 2023-02-21 | End: 2023-02-21

## 2023-02-21 RX ORDER — NALOXONE HYDROCHLORIDE 0.4 MG/ML
0.2 INJECTION, SOLUTION INTRAMUSCULAR; INTRAVENOUS; SUBCUTANEOUS
Status: DISCONTINUED | OUTPATIENT
Start: 2023-02-21 | End: 2023-02-22 | Stop reason: HOSPADM

## 2023-02-21 RX ORDER — SODIUM CHLORIDE, SODIUM LACTATE, POTASSIUM CHLORIDE, CALCIUM CHLORIDE 600; 310; 30; 20 MG/100ML; MG/100ML; MG/100ML; MG/100ML
INJECTION, SOLUTION INTRAVENOUS CONTINUOUS
Status: DISCONTINUED | OUTPATIENT
Start: 2023-02-21 | End: 2023-02-21 | Stop reason: HOSPADM

## 2023-02-21 RX ORDER — FENTANYL CITRATE 0.05 MG/ML
50 INJECTION, SOLUTION INTRAMUSCULAR; INTRAVENOUS EVERY 5 MIN PRN
Status: DISCONTINUED | OUTPATIENT
Start: 2023-02-21 | End: 2023-02-21 | Stop reason: HOSPADM

## 2023-02-21 RX ORDER — ACETAMINOPHEN 325 MG/1
650 TABLET ORAL EVERY 4 HOURS PRN
Qty: 100 TABLET | Refills: 0 | Status: SHIPPED | OUTPATIENT
Start: 2023-02-21

## 2023-02-21 RX ORDER — ASPIRIN 81 MG/1
162 TABLET ORAL DAILY
Status: DISCONTINUED | OUTPATIENT
Start: 2023-02-22 | End: 2023-02-22 | Stop reason: HOSPADM

## 2023-02-21 RX ORDER — CELECOXIB 100 MG/1
100 CAPSULE ORAL 2 TIMES DAILY
Status: DISCONTINUED | OUTPATIENT
Start: 2023-02-21 | End: 2023-02-22 | Stop reason: HOSPADM

## 2023-02-21 RX ORDER — OXYCODONE HYDROCHLORIDE 5 MG/1
5 TABLET ORAL EVERY 4 HOURS PRN
Status: DISCONTINUED | OUTPATIENT
Start: 2023-02-21 | End: 2023-02-22 | Stop reason: SINTOL

## 2023-02-21 RX ORDER — ONDANSETRON 2 MG/ML
INJECTION INTRAMUSCULAR; INTRAVENOUS PRN
Status: DISCONTINUED | OUTPATIENT
Start: 2023-02-21 | End: 2023-02-21

## 2023-02-21 RX ORDER — CEFAZOLIN SODIUM/WATER 2 G/20 ML
2 SYRINGE (ML) INTRAVENOUS SEE ADMIN INSTRUCTIONS
Status: DISCONTINUED | OUTPATIENT
Start: 2023-02-21 | End: 2023-02-21 | Stop reason: HOSPADM

## 2023-02-21 RX ORDER — HYDROXYZINE HYDROCHLORIDE 10 MG/1
10 TABLET, FILM COATED ORAL EVERY 6 HOURS PRN
Status: DISCONTINUED | OUTPATIENT
Start: 2023-02-21 | End: 2023-02-22 | Stop reason: HOSPADM

## 2023-02-21 RX ORDER — ACETAMINOPHEN 325 MG/1
650 TABLET ORAL EVERY 4 HOURS PRN
Status: DISCONTINUED | OUTPATIENT
Start: 2023-02-24 | End: 2023-02-22 | Stop reason: HOSPADM

## 2023-02-21 RX ORDER — ONDANSETRON 4 MG/1
4 TABLET, ORALLY DISINTEGRATING ORAL EVERY 30 MIN PRN
Status: DISCONTINUED | OUTPATIENT
Start: 2023-02-21 | End: 2023-02-21 | Stop reason: HOSPADM

## 2023-02-21 RX ORDER — LIDOCAINE 40 MG/G
CREAM TOPICAL
Status: DISCONTINUED | OUTPATIENT
Start: 2023-02-21 | End: 2023-02-22 | Stop reason: HOSPADM

## 2023-02-21 RX ORDER — CELECOXIB 200 MG/1
200 CAPSULE ORAL DAILY
Qty: 30 CAPSULE | Refills: 0 | Status: SHIPPED | OUTPATIENT
Start: 2023-02-21

## 2023-02-21 RX ORDER — PROCHLORPERAZINE MALEATE 5 MG
5 TABLET ORAL EVERY 6 HOURS PRN
Status: DISCONTINUED | OUTPATIENT
Start: 2023-02-21 | End: 2023-02-22 | Stop reason: HOSPADM

## 2023-02-21 RX ORDER — OXYCODONE HYDROCHLORIDE 5 MG/1
10 TABLET ORAL EVERY 4 HOURS PRN
Status: DISCONTINUED | OUTPATIENT
Start: 2023-02-21 | End: 2023-02-21 | Stop reason: HOSPADM

## 2023-02-21 RX ORDER — FENTANYL CITRATE 0.05 MG/ML
25 INJECTION, SOLUTION INTRAMUSCULAR; INTRAVENOUS EVERY 5 MIN PRN
Status: DISCONTINUED | OUTPATIENT
Start: 2023-02-21 | End: 2023-02-21 | Stop reason: HOSPADM

## 2023-02-21 RX ORDER — CELECOXIB 200 MG/1
400 CAPSULE ORAL ONCE
Status: COMPLETED | OUTPATIENT
Start: 2023-02-21 | End: 2023-02-21

## 2023-02-21 RX ORDER — PROPOFOL 10 MG/ML
INJECTION, EMULSION INTRAVENOUS CONTINUOUS PRN
Status: DISCONTINUED | OUTPATIENT
Start: 2023-02-21 | End: 2023-02-21

## 2023-02-21 RX ORDER — OXYCODONE HYDROCHLORIDE 5 MG/1
5 TABLET ORAL EVERY 4 HOURS PRN
Qty: 33 TABLET | Refills: 0 | Status: SHIPPED | OUTPATIENT
Start: 2023-02-21 | End: 2023-02-22

## 2023-02-21 RX ORDER — ONDANSETRON 2 MG/ML
4 INJECTION INTRAMUSCULAR; INTRAVENOUS EVERY 30 MIN PRN
Status: DISCONTINUED | OUTPATIENT
Start: 2023-02-21 | End: 2023-02-21 | Stop reason: HOSPADM

## 2023-02-21 RX ORDER — HYDROMORPHONE HCL IN WATER/PF 6 MG/30 ML
0.2 PATIENT CONTROLLED ANALGESIA SYRINGE INTRAVENOUS EVERY 5 MIN PRN
Status: DISCONTINUED | OUTPATIENT
Start: 2023-02-21 | End: 2023-02-21

## 2023-02-21 RX ORDER — CEFAZOLIN SODIUM 2 G/100ML
2 INJECTION, SOLUTION INTRAVENOUS EVERY 8 HOURS
Status: COMPLETED | OUTPATIENT
Start: 2023-02-21 | End: 2023-02-22

## 2023-02-21 RX ORDER — SODIUM CHLORIDE, SODIUM LACTATE, POTASSIUM CHLORIDE, CALCIUM CHLORIDE 600; 310; 30; 20 MG/100ML; MG/100ML; MG/100ML; MG/100ML
INJECTION, SOLUTION INTRAVENOUS CONTINUOUS
Status: ACTIVE | OUTPATIENT
Start: 2023-02-21 | End: 2023-02-22

## 2023-02-21 RX ORDER — LATANOPROST 50 UG/ML
1 SOLUTION/ DROPS OPHTHALMIC AT BEDTIME
Status: DISCONTINUED | OUTPATIENT
Start: 2023-02-21 | End: 2023-02-22 | Stop reason: HOSPADM

## 2023-02-21 RX ORDER — FENTANYL CITRATE 0.05 MG/ML
50 INJECTION, SOLUTION INTRAMUSCULAR; INTRAVENOUS
Status: DISCONTINUED | OUTPATIENT
Start: 2023-02-21 | End: 2023-02-21 | Stop reason: HOSPADM

## 2023-02-21 RX ORDER — MAGNESIUM HYDROXIDE 1200 MG/15ML
LIQUID ORAL PRN
Status: DISCONTINUED | OUTPATIENT
Start: 2023-02-21 | End: 2023-02-21 | Stop reason: HOSPADM

## 2023-02-21 RX ORDER — OXYCODONE HYDROCHLORIDE 5 MG/1
5 TABLET ORAL EVERY 4 HOURS PRN
Status: DISCONTINUED | OUTPATIENT
Start: 2023-02-21 | End: 2023-02-21 | Stop reason: HOSPADM

## 2023-02-21 RX ORDER — HYDROMORPHONE HCL IN WATER/PF 6 MG/30 ML
0.2 PATIENT CONTROLLED ANALGESIA SYRINGE INTRAVENOUS
Status: DISCONTINUED | OUTPATIENT
Start: 2023-02-21 | End: 2023-02-21

## 2023-02-21 RX ORDER — PRAVASTATIN SODIUM 40 MG
80 TABLET ORAL EVERY EVENING
Status: DISCONTINUED | OUTPATIENT
Start: 2023-02-21 | End: 2023-02-22 | Stop reason: HOSPADM

## 2023-02-21 RX ORDER — ACETAMINOPHEN 325 MG/1
975 TABLET ORAL ONCE
Status: COMPLETED | OUTPATIENT
Start: 2023-02-21 | End: 2023-02-21

## 2023-02-21 RX ORDER — KETOROLAC TROMETHAMINE 15 MG/ML
15 INJECTION, SOLUTION INTRAMUSCULAR; INTRAVENOUS ONCE
Status: COMPLETED | OUTPATIENT
Start: 2023-02-21 | End: 2023-02-21

## 2023-02-21 RX ORDER — OXYCODONE HYDROCHLORIDE 5 MG/1
10 TABLET ORAL EVERY 4 HOURS PRN
Status: DISCONTINUED | OUTPATIENT
Start: 2023-02-21 | End: 2023-02-22 | Stop reason: SINTOL

## 2023-02-21 RX ORDER — AMOXICILLIN 250 MG
1-2 CAPSULE ORAL 2 TIMES DAILY
Qty: 100 TABLET | Refills: 0 | Status: SHIPPED | OUTPATIENT
Start: 2023-02-21

## 2023-02-21 RX ORDER — HYDROMORPHONE HCL IN WATER/PF 6 MG/30 ML
0.4 PATIENT CONTROLLED ANALGESIA SYRINGE INTRAVENOUS
Status: DISCONTINUED | OUTPATIENT
Start: 2023-02-21 | End: 2023-02-21

## 2023-02-21 RX ADMIN — PRAVASTATIN SODIUM 80 MG: 40 TABLET ORAL at 20:20

## 2023-02-21 RX ADMIN — ACETAMINOPHEN 975 MG: 325 TABLET ORAL at 16:20

## 2023-02-21 RX ADMIN — MIDAZOLAM 2 MG: 1 INJECTION INTRAMUSCULAR; INTRAVENOUS at 10:39

## 2023-02-21 RX ADMIN — FENTANYL CITRATE 25 MCG: 50 INJECTION, SOLUTION INTRAMUSCULAR; INTRAVENOUS at 14:38

## 2023-02-21 RX ADMIN — BUPIVACAINE HYDROCHLORIDE 15 ML: 5 INJECTION, SOLUTION EPIDURAL; INTRACAUDAL at 10:40

## 2023-02-21 RX ADMIN — ONDANSETRON 4 MG: 2 INJECTION INTRAMUSCULAR; INTRAVENOUS at 12:33

## 2023-02-21 RX ADMIN — OXYCODONE HYDROCHLORIDE 5 MG: 5 TABLET ORAL at 16:20

## 2023-02-21 RX ADMIN — FENTANYL CITRATE 50 MCG: 50 INJECTION, SOLUTION INTRAMUSCULAR; INTRAVENOUS at 13:56

## 2023-02-21 RX ADMIN — CELECOXIB 100 MG: 100 CAPSULE ORAL at 21:52

## 2023-02-21 RX ADMIN — FENTANYL CITRATE 50 MCG: 50 INJECTION, SOLUTION INTRAMUSCULAR; INTRAVENOUS at 14:10

## 2023-02-21 RX ADMIN — CHLOROPROCAINE HYDROCHLORIDE 60 MG: 20 INJECTION, SOLUTION EPIDURAL; INFILTRATION; INTRACAUDAL; PERINEURAL at 11:40

## 2023-02-21 RX ADMIN — PROPOFOL 100 MCG/KG/MIN: 10 INJECTION, EMULSION INTRAVENOUS at 11:42

## 2023-02-21 RX ADMIN — KETOROLAC TROMETHAMINE 15 MG: 15 INJECTION, SOLUTION INTRAMUSCULAR; INTRAVENOUS at 13:40

## 2023-02-21 RX ADMIN — CELECOXIB 400 MG: 200 CAPSULE ORAL at 10:06

## 2023-02-21 RX ADMIN — OXYCODONE HYDROCHLORIDE 5 MG: 5 TABLET ORAL at 21:52

## 2023-02-21 RX ADMIN — DEXAMETHASONE SODIUM PHOSPHATE 4 MG: 10 INJECTION, SOLUTION INTRAMUSCULAR; INTRAVENOUS at 10:40

## 2023-02-21 RX ADMIN — SODIUM CHLORIDE, POTASSIUM CHLORIDE, SODIUM LACTATE AND CALCIUM CHLORIDE: 600; 310; 30; 20 INJECTION, SOLUTION INTRAVENOUS at 12:41

## 2023-02-21 RX ADMIN — CEFAZOLIN SODIUM 2 G: 2 INJECTION, SOLUTION INTRAVENOUS at 20:20

## 2023-02-21 RX ADMIN — ACETAMINOPHEN 975 MG: 325 TABLET ORAL at 10:05

## 2023-02-21 RX ADMIN — FENTANYL CITRATE 50 MCG: 50 INJECTION, SOLUTION INTRAMUSCULAR; INTRAVENOUS at 13:43

## 2023-02-21 RX ADMIN — MIDAZOLAM 2 MG: 1 INJECTION INTRAMUSCULAR; INTRAVENOUS at 11:38

## 2023-02-21 RX ADMIN — FENTANYL CITRATE 25 MCG: 50 INJECTION, SOLUTION INTRAMUSCULAR; INTRAVENOUS at 14:28

## 2023-02-21 RX ADMIN — ACETAMINOPHEN 975 MG: 325 TABLET ORAL at 22:54

## 2023-02-21 RX ADMIN — TRANEXAMIC ACID 1950 MG: 650 TABLET ORAL at 10:06

## 2023-02-21 RX ADMIN — OXYCODONE HYDROCHLORIDE 5 MG: 5 TABLET ORAL at 17:25

## 2023-02-21 RX ADMIN — Medication 2 G: at 11:36

## 2023-02-21 RX ADMIN — PHENYLEPHRINE HYDROCHLORIDE 0.5 MCG/KG/MIN: 10 INJECTION INTRAVENOUS at 11:47

## 2023-02-21 RX ADMIN — HYDROXYZINE HYDROCHLORIDE 10 MG: 10 TABLET ORAL at 21:52

## 2023-02-21 RX ADMIN — SODIUM CHLORIDE, POTASSIUM CHLORIDE, SODIUM LACTATE AND CALCIUM CHLORIDE: 600; 310; 30; 20 INJECTION, SOLUTION INTRAVENOUS at 10:24

## 2023-02-21 RX ADMIN — FENTANYL CITRATE 50 MCG: 50 INJECTION, SOLUTION INTRAMUSCULAR; INTRAVENOUS at 13:49

## 2023-02-21 ASSESSMENT — ACTIVITIES OF DAILY LIVING (ADL)
ADLS_ACUITY_SCORE: 18
DIFFICULTY_EATING/SWALLOWING: NO
DRESSING/BATHING_DIFFICULTY: NO
ADLS_ACUITY_SCORE: 18
ADLS_ACUITY_SCORE: 18
FALL_HISTORY_WITHIN_LAST_SIX_MONTHS: NO
ADLS_ACUITY_SCORE: 18
TOILETING_ISSUES: NO
DOING_ERRANDS_INDEPENDENTLY_DIFFICULTY: NO
ADLS_ACUITY_SCORE: 18
CONCENTRATING,_REMEMBERING_OR_MAKING_DECISIONS_DIFFICULTY: NO
WALKING_OR_CLIMBING_STAIRS_DIFFICULTY: NO
CHANGE_IN_FUNCTIONAL_STATUS_SINCE_ONSET_OF_CURRENT_ILLNESS/INJURY: NO
ADLS_ACUITY_SCORE: 18
WEAR_GLASSES_OR_BLIND: NO
ADLS_ACUITY_SCORE: 18

## 2023-02-21 NOTE — ANESTHESIA PROCEDURE NOTES
Adductor canal Procedure Note    Pre-Procedure   Staff -        Anesthesiologist:  Mark Dubois MD       Performed By: Anesthesiologist       Location: pre-op       Pre-Anesthestic Checklist: patient identified, IV checked, risks and benefits discussed, informed consent, pre-op evaluation, at physician/surgeon's request and post-op pain management  Timeout:       Correct Patient: Yes        Correct Procedure: Yes        Correct Site: Yes        Correct Position: Yes        Correct Laterality: Yes        Site Marked: Yes  Procedure Documentation  Procedure: Adductor canal       Laterality: left       Patient Position: supine       Skin prep: Chloraprep       Local skin infiltrated with mL of 1% lidocaine.        Needle Type: short bevel       Needle Gauge: 20.        Needle Length (Inches): 3.13        Ultrasound guided       1. Ultrasound was used to identify targeted nerve, plexus, vascular marker, or fascial plane and place a needle adjacent to it in real-time.       2. Ultrasound was used to visualize the spread of anesthetic in close proximity to the above referenced structure.       3. A permanent image is entered into the patient's record.    Assessment/Narrative         The placement was negative for: blood aspirated, painful injection and site bleeding       Paresthesias: No.       Bolus given via needle..        Secured via.        Insertion/Infusion Method: Single Shot       Complications: none       Injection made incrementally with aspirations every 5 mL.    Medication(s) Administered   Bupivacaine 0.5% w/ 1:400K Epi (Injection) - Injection   15 mL - 2/21/2023 10:40:00 AM  Dexamethasone 10 mg/mL PF (Perineural) - Perineural   4 mg - 2/21/2023 10:40:00 AM   Comments:  Ultrasound Interpretation, peripheral nerve block    1.  Under ultrasound guidance, needle was inserted and placed in close proximity to the nerve.  2. Ultrasound was also used to visualize the spread of the anesthetic in close  "proximity to the nerve being blocked.  3. The nerve appeared anatomically normal.  4. There were no apparent abnormal pathological findings.  5. A permanent ultrasound image was saved n the patient's record.    Mark Dubois MD   10:45 AM      FOR [facility] ([facility]) ONLY:   Pain Team Contact information: please page the Pain Team Via YellowSchedule. Search \"Pain\". During daytime hours, please page the attending first. At night please page the resident first.    "

## 2023-02-21 NOTE — PROGRESS NOTES
Hospitalist consult, chart check note:    Igor Norris is a 67 year old male with a past medical history significant for depression, anxiety, hx tobacco use, hx abnormal stress test, HTN, T2DM, obesity, HLD,  hx alcohol use disorder in remission.  Patient admitted on 2/21/2023 to undergo a scheduled left total knee arthroplasty for treatment of osteoarthritis that failed conservative measures.     OA s/p L TKA  Procedure was performed by Dr. Parrish under spinal anesthesia with abductor canal block.  EBL 25 mL. There were no intraoperative complications reported.   --post op management per primary surgical service, including pain medication and DVT prophylaxis  --PT/OT  --hgb in am (pre-op 15.6)  --IVF per surgery  --encourage IS      T2DM  A1C 7.1. recently started on metformin 500mg daily at pre-op visit.   Dexamethasone 4 mg given intraoperatively, so expect some hyperglycemia.  --hold PTA metformin, resume at discharge   --sliding scale as needed while hospitalized      HTN  PTA: losartan 100mg daily  --resume losartan in am with hold parameter     HLD  Resume statin at discharge     Depression, anxiety  Continue PTA fluoxetine        Hx alcohol use disorder in remission       Chronic medical conditions are treated and stable.  Hospitalist service will defer formal consult at this time.  Med reconciliation completed.  Please contact us with acute medical questions or concerns.    Steven Quiros PA-C

## 2023-02-21 NOTE — PROCEDURES
DATE OF SERVICE: 2/21/2023    SURGEON   SIOMARA HALL MD     FIRST ASSISTANT   VERONICA HERNANDEZ PA-C   Please bill for Mr. Hernandez as first assistant as there was no resident available to assist in this case. Assistants were necessary and performed positioning, draping, retraction, suturing and wound dressing tasks throughout the surgical procedure. The assistant was present for the entire procedure.    ASSISTANTS  FABIENNE Kiser     PREOPERATIVE DIAGNOSIS   Endstage left knee tricompartmental osteoarthritis.     POSTOPERATIVE DIAGNOSIS   Endstage left knee tricompartmental osteoarthritis.     PROCEDURE   Left total knee arthroplasty using Smith [employer] Nephew components, a size 7 Legion posterior stabilized femur, a size 8 tibia, a size 41 mm oval patella, and a size 15 posterior stabilized polyethylene insert.     INDICATIONS FOR SURGERY   Igor Norris [id number] is a 67 year old-year-old male with a long history of left knee pain. The patient had failed all of the conservative and reasonable options. After discussing with the patient, it was decided that they would benefit from the above-mentioned procedure. Informed consent was obtained. See clinic documentation for details.    ANESTHESIA   Spinal block with an abductor canal block.     FINDINGS   There was tricompartmental osteoarthritis. Ligaments were intact otherwise.     DESCRIPTION OF PROCEDURE   Igor Norris was correctly identified by myself in the PAR and their left lower extremity was marked. A single-shot adductor canal block was placed. The patient was then taken to the operating room where a spinal anesthetic was placed. The patient was placed supine. A tourniquet was placed around the left proximal thigh. A bump was placed underneath the left hip. The patient was then prepped with Avagard, followed by a 10 minute Betadine scrub, alcohol paint, DuraPrep and then draped in the usual fashion. A timeout was performed. The tourniquet was then inflated to  300 mmHg. The incision was made just medial to the patella for the mini sub-vastus approach after injection with the anesthetic solution, and carried down with sharp dissection. The vastus medialis was then released from its bed and swept laterally. The fat pad excision was performed at this time, as well as a medial release. The knee was then brought into flexion. The anterior horns of the medial and lateral menisci were excised, as well as the ACL and PCL. A PCL retractor was placed. The external tibial cutting guide was placed at 0 degrees slope. This was pinned with a planned 2 mm cut off the medial side and the cut was made with an oscillating saw. This was sized to a(n) 8, pinned in place, and we had excellent alignment with an alignment an and cortical contact with the trial. The tibia was reamed and punched in the usual fashion and the trial tibial component was removed. Attention was next turned to the femur. An intramedullary hole was drilled and we placed the distal cutting guide at 5 degrees of valgus. The block was pinned into place and the distal cut was made in the usual fashion. We then placed the AP sizing guide and it was felt that a size 7 would give us the best fit. This was placed in 3 degrees of external rotation which matched up nicely with Overton's line as well as the epicondylar access. The 4-in-1 cutting block was pinned into place. The anterior, posterior and chamfer cuts were then made in the usual fashion. The trial femoral component was placed and the box cut was made with the reamer and box chisel in the usual fashion. At this point, we trialed the knee. We placed a 15 mm spacer. We had excellent balance throughout. The tibial trial component was removed after it was marked in proper alignment. The patella was then clamped with 2 towel clips. An oscillating saw was used to make a flat cut. The patella was sized to a oval 41 mm patella, the lug holes were drilled and then undermined  in the usual fashion with a curette. The trial patella was placed. It fit nicely and sat down completely. All trial components were removed at this point. All bony surfaces were drilled with a small 3 mm drill bit and then thoroughly irrigated and dried. The rest of the joint cocktail was injected into the posterior capsule. A bone plug was placed in the central femoral canal. Two batches of Simplex cement were prepared and placed on the tibia and tibial component. The tibial component was impacted in place and the excess cement was removed. Next, cement was placed on the femoral component and on the femur and the femoral component was impacted into place. The excess cement was removed. A size 15 spacer was placed and the knee fully extended. The patella was then irrigated, dried and cement was placed onto the patella and patellar component. The patellar component was clamped in place and the excess cement removed. The knee was then lavaged with a dilute Betadine solution for 3 minutes and then irrigated again with normal saline to remove all the Betadine. The permanent 15 mm spacer was inserted with the  tool. The patella was relocated, a gram of vancomycin powder was placed in the joint, the tourniquet was deflated, and then the retinaculum was closed with a running #2 Quill suture. The subdermal layer was closed with a 0 Stratafix running suture, subdermal layer closed with a 2-0 Stratafix running suture and the skin closed with 3-0 Quill. Steri-Strips, as well as sterile dressings, an ACE bandage and ice pack were placed. There were no complications. Sponge counts correct. Tourniquet time 40 minutes. The patient was taken to PAR in stable condition.     DRAINS  None    SPECIMENS  None    COMPLICATIONS  None    ESTIMATED BLOOD LOSS  25 mL    CONDITION ON DISCHARGE FROM OR  Satisfactory    PLAN  1. Antibiotic Prophylaxis was given included Ancef 2 gm. Antibiotics given within 1 hour of surgical incision  and discontinued within 24 hrs.   2. DVT prophylaxis ASA given within 24 hours    3. Weight Bearing WBAT (Weight bearing as tolerated).   4. Discharge anticipated date POD #1 to Home  5. Pain Medication oxycodone, Tylenol and Celebrex  6. Change dressing on POD #1. Discharge with AG dressing.    SIOMARA HALL MD      @C(1)@ [facility] - [phone]

## 2023-02-21 NOTE — PROGRESS NOTES
Arrived from Recovery room.  A&O, able to make needs known.   Oriented to room/unit.  Moderate surgical pain, ice pack applied.  Ortho Stoplight Tool discussed w/ pt.  Pt's wife at the bedside.

## 2023-02-21 NOTE — ANESTHESIA POSTPROCEDURE EVALUATION
Patient: Igor Norris    Procedure: Procedure(s):  LEFT TOTAL KNEE ARTHROPLASTY       Anesthesia Type:  Spinal    Note:     Postop Pain Control: Uneventful            Sign Out: Well controlled pain   PONV: No   Neuro/Psych: Uneventful            Sign Out: Acceptable/Baseline neuro status   Airway/Respiratory: Uneventful            Sign Out: Acceptable/Baseline resp. status   CV/Hemodynamics: Uneventful            Sign Out: Acceptable CV status; No obvious hypovolemia; No obvious fluid overload   Other NRE: NONE   DID A NON-ROUTINE EVENT OCCUR? No           Last vitals:  Vitals Value Taken Time   /96 02/21/23 1445   Temp 36.2  C (97.2  F) 02/21/23 1430   Pulse 99 02/21/23 1453   Resp 10 02/21/23 1453   SpO2 96 % 02/21/23 1453   Vitals shown include unvalidated device data.    Electronically Signed By: Mark Dubois MD  February 21, 2023  3:24 PM

## 2023-02-21 NOTE — ANESTHESIA PROCEDURE NOTES
"Intrathecal Procedure Note    Pre-Procedure   Staff -        Anesthesiologist:  Mark Dubois MD       Performed By: anesthesiologist       Location: OR       Pre-Anesthestic Checklist: patient identified, IV checked, risks and benefits discussed, informed consent, monitors and equipment checked and pre-op evaluation  Timeout:       Correct Patient: Yes        Correct Procedure: Yes        Correct Position: Yes   Procedure Documentation  Procedure: intrathecal       Patient Position: sitting       Patient Prep/Sterile Barriers: sterile gloves, mask, patient draped, 3 rounds of betadine.  Waited for it to completely dry prior to procedure       Skin prep: Betadine       Insertion Site: L3-4. (midline approach).       Needle Gauge: 24.        Needle Length (Inches): 3.5        Spinal Needle Type: Emma-Booker       Introducer used       # of attempts: 1 and  # of redirects:     Assessment/Narrative         Paresthesias: No.       CSF fluid: clear.    Medication(s) Administered   2% Chloroprocaine PF (Intrathecal) - Intrathecal   60 mg - 2/21/2023 11:40:00 AM   Comments:  1% lidocaine to skin  No complications      FOR [facility] ([facility]) ONLY:   Pain Team Contact information: please page the Pain Team Via Casetext. Search \"Pain\". During daytime hours, please page the attending first. At night please page the resident first.    "

## 2023-02-21 NOTE — ANESTHESIA CARE TRANSFER NOTE
Patient: Igor Norris    Procedure: Procedure(s):  LEFT TOTAL KNEE ARTHROPLASTY       Diagnosis: Degenerative arthritis of left knee [M17.12]  Diagnosis Additional Information: No value filed.    Anesthesia Type:   Spinal     Note:    Oropharynx: oropharynx clear of all foreign objects and spontaneously breathing  Level of Consciousness: awake  Oxygen Supplementation: face mask  Level of Supplemental Oxygen (L/min / FiO2): 8  Independent Airway: airway patency satisfactory and stable  Dentition: dentition unchanged  Vital Signs Stable: post-procedure vital signs reviewed and stable  Report to RN Given: handoff report given  Patient transferred to: PACU    Handoff Report: Identifed the Patient, Identified the Reponsible Provider, Reviewed the pertinent medical history, Discussed the surgical course, Reviewed Intra-OP anesthesia mangement and issues during anesthesia, Set expectations for post-procedure period and Allowed opportunity for questions and acknowledgement of understanding      Vitals:  Vitals Value Taken Time   BP     Temp     Pulse 105 02/21/23 1320   Resp 16 02/21/23 1320   SpO2 97 % 02/21/23 1320   Vitals shown include unvalidated device data.    Electronically Signed By: SILVINO Shah CRNA  February 21, 2023  1:22 PM

## 2023-02-21 NOTE — OR NURSING
FERNANDO Dubois gave OK for pt to go to his inpt room 2326 from PACU. Tolerating ice chips and ice water. Pt can only take IV fentanyl for pain due to issues with Morphine and Dilaudid. ABCDs intact.

## 2023-02-22 ENCOUNTER — APPOINTMENT (OUTPATIENT)
Dept: OCCUPATIONAL THERAPY | Facility: CLINIC | Age: 68
End: 2023-02-22
Attending: ORTHOPAEDIC SURGERY
Payer: COMMERCIAL

## 2023-02-22 ENCOUNTER — APPOINTMENT (OUTPATIENT)
Dept: PHYSICAL THERAPY | Facility: CLINIC | Age: 68
End: 2023-02-22
Attending: ORTHOPAEDIC SURGERY
Payer: COMMERCIAL

## 2023-02-22 VITALS
DIASTOLIC BLOOD PRESSURE: 87 MMHG | BODY MASS INDEX: 34.54 KG/M2 | HEART RATE: 82 BPM | TEMPERATURE: 97.9 F | OXYGEN SATURATION: 96 % | RESPIRATION RATE: 16 BRPM | WEIGHT: 255 LBS | SYSTOLIC BLOOD PRESSURE: 163 MMHG | HEIGHT: 72 IN

## 2023-02-22 LAB
FASTING STATUS PATIENT QL REPORTED: NO
GLUCOSE SERPL-MCNC: 180 MG/DL (ref 70–99)
HGB BLD-MCNC: 12.3 G/DL (ref 13.3–17.7)

## 2023-02-22 PROCEDURE — 97110 THERAPEUTIC EXERCISES: CPT | Mod: GP

## 2023-02-22 PROCEDURE — 36415 COLL VENOUS BLD VENIPUNCTURE: CPT | Performed by: ORTHOPAEDIC SURGERY

## 2023-02-22 PROCEDURE — 250N000013 HC RX MED GY IP 250 OP 250 PS 637: Performed by: PHYSICIAN ASSISTANT

## 2023-02-22 PROCEDURE — 82947 ASSAY GLUCOSE BLOOD QUANT: CPT | Performed by: ORTHOPAEDIC SURGERY

## 2023-02-22 PROCEDURE — 97116 GAIT TRAINING THERAPY: CPT | Mod: GP

## 2023-02-22 PROCEDURE — 85018 HEMOGLOBIN: CPT | Performed by: PHYSICIAN ASSISTANT

## 2023-02-22 PROCEDURE — 97165 OT EVAL LOW COMPLEX 30 MIN: CPT | Mod: GO | Performed by: OCCUPATIONAL THERAPIST

## 2023-02-22 PROCEDURE — 97535 SELF CARE MNGMENT TRAINING: CPT | Mod: GO | Performed by: OCCUPATIONAL THERAPIST

## 2023-02-22 PROCEDURE — 250N000011 HC RX IP 250 OP 636: Performed by: PHYSICIAN ASSISTANT

## 2023-02-22 RX ORDER — TRAMADOL HYDROCHLORIDE 100 MG/1
100 TABLET, COATED ORAL EVERY 6 HOURS PRN
Qty: 30 TABLET | Refills: 0 | Status: SHIPPED | OUTPATIENT
Start: 2023-02-22

## 2023-02-22 RX ORDER — TRAMADOL HYDROCHLORIDE 100 MG/1
100 TABLET, COATED ORAL EVERY 6 HOURS PRN
Qty: 30 TABLET | Refills: 0 | Status: SHIPPED | OUTPATIENT
Start: 2023-02-22 | End: 2023-02-22

## 2023-02-22 RX ORDER — ASPIRIN 81 MG/1
81 TABLET ORAL DAILY
COMMUNITY
Start: 2023-02-22

## 2023-02-22 RX ADMIN — CEFAZOLIN SODIUM 2 G: 2 INJECTION, SOLUTION INTRAVENOUS at 05:04

## 2023-02-22 RX ADMIN — ACETAMINOPHEN 975 MG: 325 TABLET ORAL at 06:37

## 2023-02-22 RX ADMIN — LOSARTAN POTASSIUM 100 MG: 100 TABLET, FILM COATED ORAL at 08:42

## 2023-02-22 RX ADMIN — TRAMADOL HYDROCHLORIDE 50 MG: 50 TABLET ORAL at 05:10

## 2023-02-22 RX ADMIN — SENNOSIDES AND DOCUSATE SODIUM 1 TABLET: 50; 8.6 TABLET ORAL at 08:42

## 2023-02-22 RX ADMIN — FLUOXETINE 40 MG: 20 CAPSULE ORAL at 08:42

## 2023-02-22 RX ADMIN — TRAMADOL HYDROCHLORIDE 100 MG: 50 TABLET ORAL at 10:54

## 2023-02-22 RX ADMIN — ASPIRIN 162 MG: 81 TABLET, COATED ORAL at 08:42

## 2023-02-22 RX ADMIN — CELECOXIB 100 MG: 100 CAPSULE ORAL at 08:42

## 2023-02-22 RX ADMIN — POLYETHYLENE GLYCOL 3350 17 G: 17 POWDER, FOR SOLUTION ORAL at 08:43

## 2023-02-22 ASSESSMENT — ACTIVITIES OF DAILY LIVING (ADL)
ADLS_ACUITY_SCORE: 18

## 2023-02-22 NOTE — PROGRESS NOTES
Patient vital signs are at baseline: Yes  Patient able to ambulate as they were prior to admission or with assist devices provided by therapies during their stay:  No,  Reason:  PT later this evening, dangled in bed, stood at the bedside to use urinal  Patient MUST void prior to discharge:  Yes  Patient able to tolerate oral intake:  Yes  Pain has adequate pain control using Oral analgesics:  Yes  Does patient have an identified :  Yes  Has goal D/C date and time been discussed with patient:  Yes

## 2023-02-22 NOTE — PROGRESS NOTES
ORTHOPEDIC LOWER EXTREMITY PROGRESS NOTE    POD#1  Patient is a 67 year old male who underwent Procedure(s):  LEFT TOTAL KNEE ARTHROPLASTY on 2023. Pain is well controlled. Tolerating medication well, no nausea or vomiting.  Discharge instructions reviewed.    Vitals:   Blood pressure (!) 163/87, pulse 82, temperature 97.9  F (36.6  C), temperature source Oral, resp. rate 16, height 1.829 m (6'), weight 115.7 kg (255 lb), SpO2 96 %.  Temp (24hrs), Av.7  F (36.5  C), Min:97.1  F (36.2  C), Max:98.2  F (36.8  C)      Drains: None    EXAM   The patient is awake and alert.  Calves are soft and non-tender.   Sensation is intact.  Dorsiflexion and plantar flexion is intact.  Foot warm with nl cap refill.  The incision is covered.     Labs:   Recent Labs   Lab Test 23  0841 18  0135   HGB 12.3* 15.0       ASSESSMENT  S/p L TKA   PLAN  1. DVT prophylaxis: aspirin  2. Weight Bearing: WBAT (Weight bearing as tolerated).  3. Anticipated discharge date today . Discharge to Home with Outpatient Treatment.  4. Cont Pain Control Tylenol and Tramadol    Iris Burnette PA-C  [facility]

## 2023-02-22 NOTE — PROGRESS NOTES
02/21/23 1900   Appointment Info   Signing Clinician's Name / Credentials (PT) Alonso Munroe DPT   Rehab Comments (PT) WBAT, ROM as gay   Living Environment   People in Home spouse   Current Living Arrangements house   Home Accessibility no concerns   Transportation Anticipated family or friend will provide   Living Environment Comments Pt lives in single level rambler with spouse who can provide good 24/7 assist, no LALA with all needs met first floor   Self-Care   Usual Activity Tolerance good   Current Activity Tolerance moderate   Equipment Currently Used at Home none   Fall history within last six months no   Activity/Exercise/Self-Care Comment Pt IND with mobility and ADL's at baseline   General Information   Onset of Illness/Injury or Date of Surgery 02/21/23   Referring Physician Dagoberto Hernandez PA-C   Patient/Family Therapy Goals Statement (PT) go home get back to skiing and running   Pertinent History of Current Problem (include personal factors and/or comorbidities that impact the POC) Pt is a 67 y.o. male s/p Left TKA on 2/21/2023, POD#0   Existing Precautions/Restrictions fall;weight bearing   Weight-Bearing Status - LLE weight-bearing as tolerated   Cognition   Affect/Mental Status (Cognition) WNL   Orientation Status (Cognition) oriented x 4   Follows Commands (Cognition) WNL   Pain Assessment   Patient Currently in Pain Yes, see Vital Sign flowsheet   Integumentary/Edema   Integumentary/Edema Comments left knee covered in ace wrap, appears CDI   Posture    Posture Not impaired   Range of Motion (ROM)   Range of Motion ROM deficits secondary to surgical procedure;ROM deficits secondary to pain;Knee, Left (Group)   ROM Comment deficits with left knee, otherwise appears grossly WFL   Left Knee Extension/Flexion ROM   Left Knee Extension/Flexion AROM - degrees 0-90   Strength (Manual Muscle Testing)   Strength (Manual Muscle Testing) Able to perform R SLR;Able to perform L SLR;Deficits observed  during functional mobility   Strength Comments not formally assessed, deficits noted with left knee during mobility, otherwise appears grossly antigravity   Bed Mobility   Comment, (Bed Mobility) supine<>sit SBA   Transfers   Comment, (Transfers) sit<>stand with FWW CGA   Gait/Stairs (Locomotion)   [place] Level (Gait) contact guard   Assistive Device (Gait) walker, front-wheeled   Distance in Feet 10'   Distance in Feet (Gait) 400'   Pattern (Gait) step-through   Deviations/Abnormal Patterns (Gait) antalgic;yue decreased;gait speed decreased;stride length decreased;weight shifting decreased   Maintains Weight-bearing Status (Gait) able to maintain   Balance   Balance Comments normal sitting balance, good standing balance with FWW, impaired dynamic balance   Sensory Examination   Sensory Perception patient reports no sensory changes   Coordination   Coordination no deficits were identified   Muscle Tone   Muscle Tone no deficits were identified   Clinical Impression   Criteria for Skilled Therapeutic Intervention Yes, treatment indicated   PT Diagnosis (PT) impaired gait   Influenced by the following impairments pain, deficits with ROM, strength, activity tolerance, balance   Functional limitations due to impairments decreased ind with transfers, amb, ADL's   Clinical Presentation (PT Evaluation Complexity) Stable/Uncomplicated   Clinical Presentation Rationale PMH and clinical judgement   Clinical Decision Making (Complexity) low complexity   Planned Therapy Interventions (PT) balance training;bed mobility training;cryotherapy;gait training;home exercise program;patient/family education;ROM (range of motion);stair training;strengthening;stretching;transfer training;progressive activity/exercise   Anticipated Equipment Needs at Discharge (PT)   (pt will provide FWW, SEC)   Risk & Benefits of therapy have been explained evaluation/treatment results reviewed;care plan/treatment goals reviewed;risks/benefits  reviewed;current/potential barriers reviewed;participants voiced agreement with care plan;participants included;patient   PT Total Evaluation Time   PT Eval, Low Complexity Minutes (34750) 10   Plan of Care Review   Plan of Care Reviewed With patient   Physical Therapy Goals   PT Frequency 2x/day   PT Predicted Duration/Target Date for Goal Attainment 02/22/23   PT Goals Bed Mobility;Transfers;Gait   PT: Bed Mobility Supine to/from sit;Rolling;Bridging;Independent   PT: Transfers Modified independent;Sit to/from stand;Assistive device   PT: Gait Modified independent;Rolling walker;150 feet   Interventions   Interventions Quick Adds Gait Training;Therapeutic Activity;Therapeutic Procedure   Therapeutic Procedure/Exercise   Ther. Procedure: strength, endurance, ROM, flexibillity Minutes (41772) 4   Symptoms Noted During/After Treatment increased pain   Treatment Detail/Skilled Intervention Educated on post surgical benefits of TE on circulation, functional ROM, and strength. With pt in supine cued for AP's x 20, on LLE: heel slides x 10. Pt tolerated all TE well with min increase in pain during exercise   Therapeutic Activity   Therapeutic Activities: dynamic activities to improve functional performance Minutes (79435) 12   Symptoms Noted During/After Treatment Increased pain   Treatment Detail/Skilled Intervention Greeted pt supine in bed. Eval completed. Educated on orders for WBAT, ROM to flexion tolerance, and importance of keeping knee straight when resting and demonstrated how to do so. With HOB elevated supine>sit SBA, increased time with cues for hips EOB, with HOB flat sit>supine SBA. Pt able to reposition in bed IND. Sit<>stand with FWW CGA progressing to SBA, cues for safe walker and hand placement and pt able to demo back well. Answered pt questions about icing, activity at home, and normal expected course of rehab. Increased time for line management and room set up   Gait Training   Gait Training Minutes  (72990) 9   Symptoms Noted During/After Treatment (Gait Training) fatigue;increased pain   Treatment Detail/Skilled Intervention Walker height adjusted to be appropriate for pt. Gait training in hallway with FWW CGA progressing to SBA. Pt used even step-through pattern after cueing. Cues for safe walker placement and to keep inside walker during turns. Some minor path deviation with pt hitting carts in hallway. Overall moved well and had no overt LOB.   [place] Level (Gait Training) stand-by assist   Physical Assistance Level (Gait Training) supervision   Weight Bearing (Gait Training) weight-bearing as tolerated   Assistive Device (Gait Training) rolling walker   Gait Analysis Deviations decreased yue;increased time in double stance;decreased velocity of limb motion;decreased step length;decreased stride length;decreased weight-shifting ability;decreased toe-to-floor clearance;decreased swing-to-stance ratio   Impairments (Gait Analysis/Training) pain;strength decreased   PT Discharge Planning   PT Plan review/progress HEP, progress gait distance, bed mobility, safe transfers   PT Discharge Recommendation (DC Rec)   (defer to ortho)   PT Rationale for DC Rec Pt below baseline but moving well and using safe technique. Anticipate pt will progress and by discharge be at/near IND bed mobility, Mod-I transfers with FWW, Mod-I amb with FWW. Pt will have good 24/7 support at home from spouse   PT Brief overview of current status bed mobility SBA, sit<>stand with FWW SBA, amb with FWW SBA   Total Session Time   Timed Code Treatment Minutes 25   Total Session Time (sum of timed and untimed services) 35

## 2023-02-22 NOTE — PROGRESS NOTES
Patient vital signs are at baseline: Yes  Patient able to ambulate as they were prior to admission or with assist devices provided by therapies during their stay:  Yes  Patient MUST void prior to discharge:  Yes  Patient able to tolerate oral intake:  Yes  Pain has adequate pain control using Oral analgesics:  Yes  Does patient have an identified :  Yes  Has goal D/C date and time been discussed with patient:  Yes  Alert and oriented x 4. CMS intact. Dressing intact. C/o R ear ache, a few hours after oxycodone given. PT states, experienced same S/E after taking hydrocodone and dilaudid from previous surgery. Tramadol ordered, administered, effective for pain per pt. Plan to discharge home today.

## 2023-02-22 NOTE — PROVIDER NOTIFICATION
MD Notification    Notified Person: [facility] on-call    Notified Person Name:MORIS Theodore  Notification Date/Time:2/22/23 0250H    Notification Interaction:  phone    Purpose of Notification:earache from oxycodone use    Orders Received: discontinue oxycodone  Start tramadol    Comments:

## 2023-02-22 NOTE — PLAN OF CARE
Goal Outcome Evaluation:    Patient vital signs are at baseline: Yes  Patient able to ambulate as they were prior to admission or with assist devices provided by therapies during their stay:  Yes  Patient MUST void prior to discharge:  Yes  Patient able to tolerate oral intake:  Yes  Pain has adequate pain control using Oral analgesics:  Yes  Does patient have an identified :  Yes  Has goal D/C date and time been discussed with patient:  Yes   Pt A&OX4, VSS on RA, CMS intact. New dressing applied.reviewed discharge instruction with the patient and pt discharged to home.

## 2023-02-22 NOTE — PLAN OF CARE
Physical Therapy Discharge Summary    Reason for therapy discharge:    Discharged to home with outpatient therapy.    Progress towards therapy goal(s). See goals on Care Plan in [facility] electronic health record for goal details.  Goals partially met.  Barriers to achieving goals:   discharge from facility.    Therapy recommendation(s):    Continued therapy is recommended.  Rationale/Recommendations:  OP PT per Ortho/MD/PA protocol.

## 2023-02-22 NOTE — PROGRESS NOTES
02/22/23 0906   Appointment Info   Signing Clinician's Name / Credentials (OT) Brittany Grimaldo OTR/L   Quick Adds   Quick Adds Certification   Living Environment   People in Home spouse   Current Living Arrangements house   Home Accessibility no concerns   Transportation Anticipated family or friend will provide   Living Environment Comments Pt lives in single level rambler with spouse who can provide good 24/7 assist, no LALA with all needs met first floor   Self-Care   Usual Activity Tolerance good   Current Activity Tolerance moderate   Equipment Currently Used at Home none   Fall history within last six months no   Activity/Exercise/Self-Care Comment was I with ADLs and functional transfers prior to admission   Instrumental Activities of Daily Living (IADL)   Previous Responsibilities driving;meal prep;shopping;medication management;finances   General Information   Onset of Illness/Injury or Date of Surgery 02/21/23   Referring Physician Dagoberto Hernandez PA-C   Patient/Family Therapy Goal Statement (OT) to get back to skiing and running   Additional Occupational Profile Info/Pertinent History of Current Problem Pt is a 67 y.o. male s/p Left TKA on 2/21/2023, POD#0   Existing Precautions/Restrictions fall   Left Lower Extremity (Weight-bearing Status) weight-bearing as tolerated (WBAT)   General Observations and Info pt supine in bed agreeable to OT eval   Cognitive Status Examination   Orientation Status orientation to person, place and time   Visual Perception   Visual Impairment/Limitations WNL;corrective lenses full-time   Sensory   Sensory Quick Adds sensation intact   Pain Assessment   Patient Currently in Pain No   Posture   Posture not impaired   Range of Motion Comprehensive   General Range of Motion no range of motion deficits identified   Strength Comprehensive (MMT)   General Manual Muscle Testing (MMT) Assessment no strength deficits identified   Muscle Tone Assessment   Muscle Tone Quick Adds  No deficits were identified   Coordination   Upper Extremity Coordination No deficits were identified   Bed Mobility   Comment (Bed Mobility) SBA to tranfser OOB   Transfers   Transfers bed-chair transfer   Transfer Skill: Bed to Chair/Chair to Bed   Bed-Chair [place] (Transfers) supervision   Balance   Balance Assessment no deficits were identified   Balance Comments B UE support on walker   Activities of Daily Living   BADL Assessment/Intervention lower body dressing;grooming   Lower Body Dressing Assessment/Training   [place] Level (Lower Body Dressing) set up   Grooming Assessment/Training   [place] Level (Grooming) set up;supervision   Clinical Impression   Criteria for Skilled Therapeutic Interventions Met (OT) Yes, treatment indicated   OT Diagnosis decreased functional transfers   OT Problem List-Impairments impacting ADL problems related to;activity tolerance impaired;flexibility;post-surgical precautions   Assessment of Occupational Performance 1-3 Performance Deficits   Identified Performance Deficits decreased dressing and transfers   Planned Therapy Interventions (OT) ADL retraining;transfer training   Clinical Decision Making Complexity (OT) low complexity   Risk & Benefits of therapy have been explained evaluation/treatment results reviewed;care plan/treatment goals reviewed;risks/benefits reviewed;participants voiced agreement with care plan;current/potential barriers reviewed;participants included;patient   OT Total Evaluation Time   OT Eval, Low Complexity Minutes (57972) 10   Therapy Certification   Medical Diagnosis L TKA   Start of Care Date 02/22/23   Certification date from 02/22/23   Certification date to 02/22/23   OT Goals   Therapy Frequency (OT) One time eval and treatment   OT Predicted Duration/Target Date for Goal Attainment 02/22/23   OT Goals Lower Body Bathing;Toilet Transfer/Toileting;Hygiene/Grooming   OT: Hygiene/Grooming modified independent;while standing;Goal  Met   OT: Lower Body Bathing Modified independent;Goal Met   OT: Toilet Transfer/Toileting Modified independent;Goal Met   Interventions   Interventions Quick Adds Self-Care/Home Management   Self-Care/Home Management   Self-Care/Home Mgmt/ADL, Compensatory, Meal Prep Minutes (22518) 10   Symptoms Noted During/After Treatment (Meal Preparation/Planning Training) none   Treatment Detail/Skilled Intervention educated on LE dressing technique due to decreased flexibility and pain pt was able to demo LE dressing sitting at EOB. will also have his wife to assist as needed. pt was able to demo mod I with doff and don of socks. still had ace wrap on and needing nursing to apply banadage so did not get dressed, however pt will have no difficulty completing task. amb to bathroom and completed grooming at sink mod I. educated on safe toilet and shower transfers. able to complete toilet tranfser mod I and verbal understanding of shower transfer. up in recliner at end of session. all needs in reach and nursing notified   OT Discharge Planning   OT Plan d/c OT   OT Rationale for DC Rec pt seen for eval and treat. all OT goals met. has wife who is able to help as needed. anticipate he will do well.   OT Brief overview of current status mod I with dressing, grooming and BR transfers   Total Session Time   Timed Code Treatment Minutes 10   Total Session Time (sum of timed and untimed services) 20    [facility]  OUTPATIENT OCCUPATIONAL THERAPY  EVALUATION  PLAN OF TREATMENT FOR OUTPATIENT REHABILITATION  (COMPLETE FOR INITIAL CLAIMS ONLY)  Patient's Last Name, First Name, M.I.  YOB: 1955  Igor Norris                          Provider's Name  [facility] Medical Record No.  [id number]                             Onset Date:  02/21/23   Start of Care Date:  02/22/23   Type:     ___PT   _X_OT   ___SLP Medical Diagnosis:  L TKA                    OT  Diagnosis:  decreased functional transfers Visits from SOC:  1     See note for plan of treatment, functional goals and certification details    I CERTIFY THE NEED FOR THESE SERVICES FURNISHED UNDER        THIS PLAN OF TREATMENT AND WHILE UNDER MY CARE     (Physician co-signature of this document indicates review and certification of the therapy plan).

## 2023-04-09 ENCOUNTER — HEALTH MAINTENANCE LETTER (OUTPATIENT)
Age: 68
End: 2023-04-09

## 2024-06-16 ENCOUNTER — HEALTH MAINTENANCE LETTER (OUTPATIENT)
Age: 69
End: 2024-06-16

## 2025-06-21 ENCOUNTER — HEALTH MAINTENANCE LETTER (OUTPATIENT)
Age: 70
End: 2025-06-21

## (undated) DEVICE — LINEN TOWEL PACK X5 5464

## (undated) DEVICE — SOLUTION WOUND CLEANSING 3/4OZ 10% PVP EA-L3011FB-50

## (undated) DEVICE — CAST PADDING 6" STERILE 9046S

## (undated) DEVICE — SU STRATAFIX PDS PLUS 0 CT 45CM SXPP1A406

## (undated) DEVICE — DRSG STERI STRIP 1/2X4" R1547

## (undated) DEVICE — BLADE SAW SAGITTAL STRK 21X90X1.27MM HD SYS 6 6221-127-090

## (undated) DEVICE — CAST PADDING 6" UNSTERILE 9046

## (undated) DEVICE — DRAPE STERI U 1015

## (undated) DEVICE — NDL SPINAL 18GA 3.5" 405184

## (undated) DEVICE — CAST PADDING 4" UNSTERILE 9044

## (undated) DEVICE — BLADE SAW SAGITTAL STRK 18X90X1.27MM HD SYS 6 6118-127-090

## (undated) DEVICE — MANIFOLD NEPTUNE 4 PORT 700-20

## (undated) DEVICE — BLADE CLIPPER 4412A

## (undated) DEVICE — HOOD SURG T7PLUS PEEL AWAY FACE SHIELD STRL LF 0416-801-100

## (undated) DEVICE — GLOVE BIOGEL PI MICRO INDICATOR UNDERGLOVE SZ 8.0 48980

## (undated) DEVICE — SOL WATER IRRIG 1000ML BOTTLE 2F7114

## (undated) DEVICE — ESU GROUND PAD UNIVERSAL W/O CORD

## (undated) DEVICE — PACK TOTAL KNEE SOP15TKFSD

## (undated) DEVICE — SYR 30ML LL W/O NDL 302832

## (undated) DEVICE — DRSG ADAPTIC 3X8" 6113

## (undated) DEVICE — SUCTION IRR SYSTEM W/O TIP INTERPULSE HANDPIECE 0210-100-000

## (undated) DEVICE — WRAP EZY KNEE

## (undated) DEVICE — BONE CLEANING TIP INTERPULSE  0210-010-000

## (undated) DEVICE — DRAPE IOBAN INCISE 23X17" 6650EZ

## (undated) DEVICE — SOL NACL 0.9% IRRIG 3000ML BAG 2B7477

## (undated) DEVICE — DRAPE ARTHROSCOPY 120X92" 9414

## (undated) DEVICE — GLOVE BIOGEL PI ORTHOPRO SZ 8.5 47685

## (undated) DEVICE — BONE CEMENT MIXEVAC III HI VAC KIT  0206-015-000

## (undated) DEVICE — SU PDO 1 STRATAFIX 36X36CM CTX TAPERPOINT SXPD2B405

## (undated) RX ORDER — CEFAZOLIN SODIUM/WATER 2 G/20 ML
SYRINGE (ML) INTRAVENOUS
Status: DISPENSED
Start: 2023-02-21

## (undated) RX ORDER — FENTANYL CITRATE 0.05 MG/ML
INJECTION, SOLUTION INTRAMUSCULAR; INTRAVENOUS
Status: DISPENSED
Start: 2023-02-21

## (undated) RX ORDER — CELECOXIB 200 MG/1
CAPSULE ORAL
Status: DISPENSED
Start: 2023-02-21

## (undated) RX ORDER — DEXAMETHASONE SODIUM PHOSPHATE 4 MG/ML
INJECTION, SOLUTION INTRA-ARTICULAR; INTRALESIONAL; INTRAMUSCULAR; INTRAVENOUS; SOFT TISSUE
Status: DISPENSED
Start: 2023-02-21

## (undated) RX ORDER — VANCOMYCIN HYDROCHLORIDE 1 G/20ML
INJECTION, POWDER, LYOPHILIZED, FOR SOLUTION INTRAVENOUS
Status: DISPENSED
Start: 2023-02-21

## (undated) RX ORDER — TRANEXAMIC ACID 650 MG/1
TABLET ORAL
Status: DISPENSED
Start: 2023-02-21

## (undated) RX ORDER — ONDANSETRON 2 MG/ML
INJECTION INTRAMUSCULAR; INTRAVENOUS
Status: DISPENSED
Start: 2023-02-21

## (undated) RX ORDER — ACETAMINOPHEN 325 MG/1
TABLET ORAL
Status: DISPENSED
Start: 2023-02-21

## (undated) RX ORDER — PROPOFOL 10 MG/ML
INJECTION, EMULSION INTRAVENOUS
Status: DISPENSED
Start: 2023-02-21

## (undated) RX ORDER — KETOROLAC TROMETHAMINE 15 MG/ML
INJECTION, SOLUTION INTRAMUSCULAR; INTRAVENOUS
Status: DISPENSED
Start: 2023-02-21